# Patient Record
Sex: FEMALE | Race: WHITE | NOT HISPANIC OR LATINO | Employment: FULL TIME | URBAN - METROPOLITAN AREA
[De-identification: names, ages, dates, MRNs, and addresses within clinical notes are randomized per-mention and may not be internally consistent; named-entity substitution may affect disease eponyms.]

---

## 2017-01-12 ENCOUNTER — LAB (OUTPATIENT)
Dept: LAB | Facility: HOSPITAL | Age: 57
End: 2017-01-12

## 2017-01-12 ENCOUNTER — OFFICE VISIT (OUTPATIENT)
Dept: BARIATRICS/WEIGHT MGMT | Facility: CLINIC | Age: 57
End: 2017-01-12

## 2017-01-12 VITALS
BODY MASS INDEX: 34.21 KG/M2 | HEART RATE: 68 BPM | WEIGHT: 218 LBS | SYSTOLIC BLOOD PRESSURE: 119 MMHG | TEMPERATURE: 98.3 F | HEIGHT: 67 IN | RESPIRATION RATE: 16 BRPM | DIASTOLIC BLOOD PRESSURE: 67 MMHG

## 2017-01-12 DIAGNOSIS — G47.33 OSA (OBSTRUCTIVE SLEEP APNEA): ICD-10-CM

## 2017-01-12 DIAGNOSIS — Z71.3 DIETARY COUNSELING: ICD-10-CM

## 2017-01-12 DIAGNOSIS — E66.9 OBESITY, CLASS II, BMI 35-39.9: Primary | ICD-10-CM

## 2017-01-12 DIAGNOSIS — Z87.19 HISTORY OF FATTY INFILTRATION OF LIVER: ICD-10-CM

## 2017-01-12 DIAGNOSIS — E66.9 OBESITY, CLASS II, BMI 35-39.9: ICD-10-CM

## 2017-01-12 LAB
ALBUMIN SERPL-MCNC: 4 G/DL (ref 3.5–5.2)
ALBUMIN/GLOB SERPL: 1.1 G/DL
ALP SERPL-CCNC: 82 U/L (ref 39–117)
ALT SERPL W P-5'-P-CCNC: 18 U/L (ref 1–33)
ANION GAP SERPL CALCULATED.3IONS-SCNC: 13.1 MMOL/L
AST SERPL-CCNC: 21 U/L (ref 1–32)
BASOPHILS # BLD AUTO: 0.02 10*3/MM3 (ref 0–0.2)
BASOPHILS NFR BLD AUTO: 0.3 % (ref 0–1.5)
BILIRUB SERPL-MCNC: 0.6 MG/DL (ref 0.1–1.2)
BUN BLD-MCNC: 18 MG/DL (ref 6–20)
BUN/CREAT SERPL: 28.6 (ref 7–25)
CALCIUM SPEC-SCNC: 9.4 MG/DL (ref 8.6–10.5)
CHLORIDE SERPL-SCNC: 101 MMOL/L (ref 98–107)
CHOLEST SERPL-MCNC: 225 MG/DL (ref 0–200)
CO2 SERPL-SCNC: 23.9 MMOL/L (ref 22–29)
CREAT BLD-MCNC: 0.63 MG/DL (ref 0.57–1)
DEPRECATED RDW RBC AUTO: 44.6 FL (ref 37–54)
EOSINOPHIL # BLD AUTO: 0.18 10*3/MM3 (ref 0–0.7)
EOSINOPHIL NFR BLD AUTO: 2.9 % (ref 0.3–6.2)
ERYTHROCYTE [DISTWIDTH] IN BLOOD BY AUTOMATED COUNT: 14.2 % (ref 11.7–13)
FERRITIN SERPL-MCNC: 126.9 NG/ML (ref 13–150)
FOLATE SERPL-MCNC: 10.25 NG/ML (ref 4.78–24.2)
GFR SERPL CREATININE-BSD FRML MDRD: 98 ML/MIN/1.73
GLOBULIN UR ELPH-MCNC: 3.5 GM/DL
GLUCOSE BLD-MCNC: 92 MG/DL (ref 65–99)
HCT VFR BLD AUTO: 40.5 % (ref 35.6–45.5)
HDLC SERPL-MCNC: 40 MG/DL (ref 40–60)
HGB BLD-MCNC: 13.3 G/DL (ref 11.9–15.5)
IMM GRANULOCYTES # BLD: 0 10*3/MM3 (ref 0–0.03)
IMM GRANULOCYTES NFR BLD: 0 % (ref 0–0.5)
IRON 24H UR-MRATE: 51 MCG/DL (ref 37–145)
LDLC SERPL CALC-MCNC: 158 MG/DL (ref 0–100)
LDLC/HDLC SERPL: 3.94 {RATIO}
LYMPHOCYTES # BLD AUTO: 2.54 10*3/MM3 (ref 0.9–4.8)
LYMPHOCYTES NFR BLD AUTO: 40.4 % (ref 19.6–45.3)
MCH RBC QN AUTO: 28.4 PG (ref 26.9–32)
MCHC RBC AUTO-ENTMCNC: 32.8 G/DL (ref 32.4–36.3)
MCV RBC AUTO: 86.4 FL (ref 80.5–98.2)
MONOCYTES # BLD AUTO: 0.51 10*3/MM3 (ref 0.2–1.2)
MONOCYTES NFR BLD AUTO: 8.1 % (ref 5–12)
NEUTROPHILS # BLD AUTO: 3.04 10*3/MM3 (ref 1.9–8.1)
NEUTROPHILS NFR BLD AUTO: 48.3 % (ref 42.7–76)
PLATELET # BLD AUTO: 229 10*3/MM3 (ref 140–500)
PMV BLD AUTO: 10 FL (ref 6–12)
POTASSIUM BLD-SCNC: 4.1 MMOL/L (ref 3.5–5.2)
PROT SERPL-MCNC: 7.5 G/DL (ref 6–8.5)
RBC # BLD AUTO: 4.69 10*6/MM3 (ref 3.9–5.2)
SODIUM BLD-SCNC: 138 MMOL/L (ref 136–145)
TRIGL SERPL-MCNC: 137 MG/DL (ref 0–150)
VLDLC SERPL-MCNC: 27.4 MG/DL (ref 5–40)
WBC NRBC COR # BLD: 6.29 10*3/MM3 (ref 4.5–10.7)

## 2017-01-12 PROCEDURE — 85025 COMPLETE CBC W/AUTO DIFF WBC: CPT

## 2017-01-12 PROCEDURE — 99213 OFFICE O/P EST LOW 20 MIN: CPT | Performed by: SURGERY

## 2017-01-12 PROCEDURE — 83540 ASSAY OF IRON: CPT

## 2017-01-12 PROCEDURE — 80061 LIPID PANEL: CPT | Performed by: SURGERY

## 2017-01-12 PROCEDURE — 80053 COMPREHEN METABOLIC PANEL: CPT

## 2017-01-12 PROCEDURE — 82728 ASSAY OF FERRITIN: CPT

## 2017-01-12 PROCEDURE — 83921 ORGANIC ACID SINGLE QUANT: CPT

## 2017-01-12 PROCEDURE — 84425 ASSAY OF VITAMIN B-1: CPT

## 2017-01-12 PROCEDURE — 36415 COLL VENOUS BLD VENIPUNCTURE: CPT

## 2017-01-12 PROCEDURE — 82746 ASSAY OF FOLIC ACID SERUM: CPT

## 2017-01-12 NOTE — MR AVS SNAPSHOT
Elizabeth Montiel   1/12/2017 10:00 AM   Office Visit    Dept Phone:  [phone]   Encounter #:  [id number]    Provider:  Enrique Hannon Jr., MD   Department:  [facility] BARIATRIC SURGERY                Your Full Care Plan              Today's Medication Changes          These changes are accurate as of: 1/12/17 10:24 AM.  If you have any questions, ask your nurse or doctor.               Stop taking medication(s)listed here:     benzonatate 200 MG capsule   Commonly known as:  TESSALON           omeprazole 20 MG capsule   Commonly known as:  priLOSEC           STAHIST AD 25-60 MG tablet   Generic drug:  Chlorcyclizine-Pseudoephed                      Your Updated Medication List          This list is accurate as of: 1/12/17 10:24 AM.  Always use your most recent med list.                levothyroxine 112 MCG tablet   Commonly known as:  SYNTHROID, LEVOTHROID   Take 1 tablet by mouth Every Morning for 180 days.       MINIVELLE 0.0375 MG/24HR   Generic drug:  estradiol       NASCOBAL 500 MCG/0.1ML solution   Generic drug:  Cyanocobalamin       pseudoephedrine-guaifenesin  MG per 12 hr tablet   Commonly known as:  MUCINEX D   Take 1 tablet by mouth Every 12 (Twelve) Hours.       ursodiol 300 MG capsule   Commonly known as:  ACTIGALL   Take 1 capsule by mouth 2 (two) times a day.       venlafaxine XR 75 MG 24 hr capsule   Commonly known as:  EFFEXOR-XR   Take 1 capsule by mouth Every Morning for 180 days.               You Were Diagnosed With        Codes Comments    Obesity, Class II, BMI 35-39.9    -  Primary ICD-10-CM: E66.01  ICD-9-CM: 278.01     Dietary counseling     ICD-10-CM: Z71.3  ICD-9-CM: V65.3     History of fatty infiltration of liver     ICD-10-CM: Z87.19  ICD-9-CM: V12.79     LUANNE (obstructive sleep apnea)     ICD-10-CM: G47.33  ICD-9-CM: 327.23       Instructions     None    Patient Instructions History      Upcoming Appointments     Visit Type Date  "Time Department    FOLLOW UP 1/12/2017 10:00 AM [facility]    FOLLOW UP 4/13/2017  9:00 AM [facility]ATR[facility]      MyChart Signup     Our records indicate that you have an active [facility] [employer] account.    You can view your After Visit Summary by going to [employer] and logging in with your [employer] username and password.  If you don't have a [employer] username and password but a parent or guardian has access to your record, the parent or guardian should login with their own [employer] username and password and access your record to view the After Visit Summary.    If you have questions, you can email [email or web address] or call [phone] to talk to our [employer] staff.  Remember, [employer] is NOT to be used for urgent needs.  For medical emergencies, dial 911.               Other Info from Your Visit           Your Appointments     Apr 13, 2017  9:00 AM EDT   Follow Up with MELVIN Garcia   [facility] BARIATRIC SURGERY (--)    [address]  [facility] [id number] [phone]           Arrive 15 minutes prior to appointment.              Allergies     Penicillins  Anaphylaxis    Tricor [Fenofibrate]  Rash      Reason for Visit     Follow-up s/p gastric sleeve c/o constipation and alopecia      Vital Signs     Blood Pressure Pulse Temperature Respirations Height Weight    119/67 68 98.3 °F (36.8 °C) (Oral) 16 66.5\" (168.9 cm) 218 lb (98.9 kg)    Body Mass Index Smoking Status                34.66 kg/m2 Never Smoker          Problems and Diagnoses Noted     Dietary counseling    History of fatty infiltration of liver    Obesity    LUANNE (obstructive sleep apnea)      No Longer an Issue     Chronic fatigue    Nauseous    Prediabetes        "

## 2017-01-12 NOTE — PROGRESS NOTES
[facility] BARIATRIC SURGERY  [address]  [facility] [address]  [address]  [facility] [address]  Dept: [phone]  1/12/2017      Elizabeth Montiel.  [id number]  [id number]  1960  female      Chief Complaint   Patient presents with   • Follow-up     s/p gastric sleeve c/o constipation and alopecia       BH Post-Op Bariatric Surgery:   Elizabeth Montiel is status post laparopscopic Sleeve procedure, performed on 7/11/16    HPI:   Today's weight is 218 lb (98.9 kg) pounds, today's BMI is Body mass index is 34.66 kg/(m^2)., she has a  loss of 32 pounds since the last visit and her weight loss since surgery is 90 pounds. The patient reports a decreased portion size and loss of appetite.      Elizabeth Montiel denies nausea, vomiting, dysphagia, abdominal pain or heartburn.     Diet and Exercise: Diet history reviewed and discussed with the patient. Weight loss/gains to date discussed with the patient. The patient states they are eating 90 grams of protein per day. She reports eating 3 meals per day, a typical portion size of 1/2 cup, eating 2 snacks per day, drinking 5 or more 8-oz. glasses of water per day, no carbonated beverage consumption and exercising regularly.     Patient 6 months status post laparoscopic sleeve gastrectomy who is doing very well.  Only complaint is constipation and some hair thinning.    Supplements: Bariatric advantage vitamins per gastric sleeve.     Review of Systems   All other systems reviewed and are negative.      Patient Active Problem List   Diagnosis   • Acquired hypothyroidism   • Depression   • Multiple joint pain   • LUANNE (obstructive sleep apnea)   • History of fatty infiltration of liver   • Borderline hyperlipidemia   • Dietary counseling   • Obesity, Class II, BMI 35-39.9       The following portions of the patient's history were reviewed and updated as appropriate: allergies, current medications, past  family history, past medical history, past social history, past surgical history and problem list.    Vitals:    01/12/17 0951   BP: 119/67   Pulse: 68   Resp: 16   Temp: 98.3 °F (36.8 °C)       Physical Exam   Constitutional: She is oriented to person, place, and time. She appears well-nourished.   HENT:   Head: Normocephalic and atraumatic.   Mouth/Throat: Oropharynx is clear and moist.   Eyes: Conjunctivae and EOM are normal. Pupils are equal, round, and reactive to light. No scleral icterus.   Neck: Normal range of motion. Neck supple. No thyromegaly present.   Cardiovascular: Normal rate and regular rhythm.    Pulmonary/Chest: Effort normal and breath sounds normal.   Abdominal: Soft. Bowel sounds are normal. She exhibits no distension. There is no tenderness.   Musculoskeletal: Normal range of motion.   Lymphadenopathy:     She has no cervical adenopathy.   Neurological: She is alert and oriented to person, place, and time. No cranial nerve deficit. Coordination normal.   Skin: Skin is warm and dry. No erythema.   Psychiatric: She has a normal mood and affect. Her behavior is normal.   Vitals reviewed.        Assessment:   Post-op, the patient doing very well 6 months status post laparoscopic sleeve gastrectomy.  She has lost over 90 pounds since surgery and is feeling great.  I instructed her to take MiraLAX when necessary and Metamucil every day for the constipation.  Also she can add biotin for hair thinning.  Continue with her diet and exercise routine.  We will check 6 months labs.     Plan:       Activity restrictions: none.   Recommended patient be sure to get at least 70 grams of protein per day by eating small, frequent meals all with high lean protein choices. Be sure to limit/cut back on daily carbohydrate intake. Discussed with the patient the recommended amount of water per day to intake- half of body weight in ounces. Reviewed vitamin requirements. Be sure to do routine exercise, 150 minutes per  week minimum, including both cardio and strength training.     Instructions / Recommendations: dietary counseling recommended, recommended a daily protein intake of  grams, vitamin supplement(s) recommended, recommended exercising at least 150 minutes per week, behavior modifications recommended and instructed to call the office for concerns, questions, or problems.     The patient was instructed to follow up in 3 months .     The patient was counseled regarding. Total time spent face to face was 20 minutes and more than half the time was spent counseling.

## 2017-01-14 LAB — METHYLMALONATE SERPL-SCNC: 148 NMOL/L (ref 0–378)

## 2017-01-15 LAB — VIT B1 BLD-SCNC: 119.2 NMOL/L (ref 66.5–200)

## 2017-03-04 ENCOUNTER — OFFICE VISIT (OUTPATIENT)
Dept: RETAIL CLINIC | Facility: CLINIC | Age: 57
End: 2017-03-04

## 2017-03-04 VITALS — TEMPERATURE: 98.7 F | HEART RATE: 82 BPM | OXYGEN SATURATION: 98 %

## 2017-03-04 DIAGNOSIS — J01.90 ACUTE NON-RECURRENT SINUSITIS, UNSPECIFIED LOCATION: Primary | ICD-10-CM

## 2017-03-04 PROCEDURE — 99213 OFFICE O/P EST LOW 20 MIN: CPT | Performed by: NURSE PRACTITIONER

## 2017-03-04 PROCEDURE — 96372 THER/PROPH/DIAG INJ SC/IM: CPT | Performed by: NURSE PRACTITIONER

## 2017-03-04 RX ORDER — SULFAMETHOXAZOLE AND TRIMETHOPRIM 800; 160 MG/1; MG/1
1 TABLET ORAL 2 TIMES DAILY
Qty: 20 TABLET | Refills: 0 | Status: SHIPPED | OUTPATIENT
Start: 2017-03-04 | End: 2017-03-14

## 2017-03-04 RX ORDER — METHYLPREDNISOLONE ACETATE 40 MG/ML
40 INJECTION, SUSPENSION INTRA-ARTICULAR; INTRALESIONAL; INTRAMUSCULAR; SOFT TISSUE ONCE
Status: COMPLETED | OUTPATIENT
Start: 2017-03-04 | End: 2017-03-04

## 2017-03-04 RX ADMIN — METHYLPREDNISOLONE ACETATE 40 MG: 40 INJECTION, SUSPENSION INTRA-ARTICULAR; INTRALESIONAL; INTRAMUSCULAR; SOFT TISSUE at 09:27

## 2017-03-04 NOTE — PROGRESS NOTES
Subjective   Patient ID: Elizabeth Montiel is a 56 y.o. female presents with   Chief Complaint   Patient presents with   • URI     1 week       HPI Comments: Cc: green nasal discharge x 3d. Mild assoc cough, hoarseness x 1 week. She had flu shot this year. Has taken some mucinex with no relief. Pt had anaphylactic reaction to PCN in the past.       Allergies   Allergen Reactions   • Penicillins Anaphylaxis   • Codeine    • Tricor [Fenofibrate] Rash       The following portions of the patient's history were reviewed and updated as appropriate: allergies, current medications, past family history, past medical history, past social history, past surgical history and problem list.      Review of Systems   Constitutional: Positive for fatigue. Negative for activity change, fever and unexpected weight change.   HENT: Positive for congestion, postnasal drip, rhinorrhea, sinus pressure, sore throat and voice change. Negative for ear pain.         Hoarseness   Eyes: Negative for pain and discharge.   Respiratory: Positive for cough. Negative for chest tightness, shortness of breath and wheezing.    Cardiovascular: Negative for chest pain and palpitations.   Gastrointestinal: Negative for abdominal pain, diarrhea and vomiting.   Endocrine: Negative.    Genitourinary: Negative.    Musculoskeletal: Negative for joint swelling.   Skin: Negative for color change, rash and wound.   Allergic/Immunologic: Negative.    Neurological: Negative for seizures and syncope.   Psychiatric/Behavioral: Negative.        Objective     Vitals:    03/04/17 0830   Pulse: 82   Temp: 98.7 °F (37.1 °C)   SpO2: 98%         Physical Exam   Constitutional: She is oriented to person, place, and time. She appears well-developed and well-nourished.  Non-toxic appearance. No distress.   HENT:   Head: Normocephalic and atraumatic. Hair is normal.   Right Ear: Hearing, tympanic membrane, external ear and ear canal normal. No drainage, swelling or tenderness.    Left Ear: Hearing, tympanic membrane, external ear and ear canal normal. No drainage, swelling or tenderness.   Nose: Mucosal edema and rhinorrhea present. No epistaxis.   Mouth/Throat: Uvula is midline and mucous membranes are normal. No oral lesions. No uvula swelling. Posterior oropharyngeal erythema present. No oropharyngeal exudate. Tonsils are 1+ on the right. Tonsils are 1+ on the left. No tonsillar exudate.   +severe hoarseness   Eyes: Conjunctivae, EOM and lids are normal. Pupils are equal, round, and reactive to light. Right eye exhibits no discharge. Left eye exhibits no discharge. No scleral icterus.   Neck: Normal range of motion. Neck supple.   Cardiovascular: Normal rate, regular rhythm and normal heart sounds.  Exam reveals no gallop.    No murmur heard.  Pulmonary/Chest: Effort normal and breath sounds normal. No stridor. No respiratory distress. She has no wheezes. She has no rales. She exhibits no tenderness.   Abdominal: Soft. There is no tenderness.   Lymphadenopathy:        Head (right side): No tonsillar adenopathy present.        Head (left side): No tonsillar adenopathy present.     She has no cervical adenopathy.   Neurological: She is alert and oriented to person, place, and time. She exhibits normal muscle tone.   Skin: Skin is warm and dry. No rash noted. She is not diaphoretic.   Psychiatric: She has a normal mood and affect. Her behavior is normal. Judgment and thought content normal.   Nursing note and vitals reviewed.        Elizabeth was seen today for uri.    Diagnoses and all orders for this visit:    Acute non-recurrent sinusitis, unspecified location  -     sulfamethoxazole-trimethoprim (BACTRIM DS,SEPTRA DS) 800-160 MG per tablet; Take 1 tablet by mouth 2 (Two) Times a Day for 10 days.  -     methylPREDNISolone acetate (DEPO-medrol) injection 40 mg; Inject 1 mL into the shoulder, thigh, or buttocks 1 (One) Time.    Increase fluids, rest, activity as tolerated, Tylenol OTC as  needed for pain  NS nasal rinse prn congestion. Voice rest.  Follow-up with Primary Care Physician in 24-48 hours if these symptoms worsen or fail to improve as anticipated.

## 2017-04-13 ENCOUNTER — LAB (OUTPATIENT)
Dept: LAB | Facility: HOSPITAL | Age: 57
End: 2017-04-13

## 2017-04-13 ENCOUNTER — OFFICE VISIT (OUTPATIENT)
Dept: BARIATRICS/WEIGHT MGMT | Facility: CLINIC | Age: 57
End: 2017-04-13

## 2017-04-13 VITALS
TEMPERATURE: 98.1 F | RESPIRATION RATE: 16 BRPM | BODY MASS INDEX: 30.92 KG/M2 | WEIGHT: 197 LBS | HEART RATE: 58 BPM | DIASTOLIC BLOOD PRESSURE: 64 MMHG | HEIGHT: 67 IN | SYSTOLIC BLOOD PRESSURE: 109 MMHG

## 2017-04-13 DIAGNOSIS — E66.9 OBESITY, CLASS I, BMI 30-34.9: Primary | ICD-10-CM

## 2017-04-13 DIAGNOSIS — G47.33 OSA (OBSTRUCTIVE SLEEP APNEA): ICD-10-CM

## 2017-04-13 DIAGNOSIS — M25.50 MULTIPLE JOINT PAIN: ICD-10-CM

## 2017-04-13 DIAGNOSIS — Z71.3 DIETARY COUNSELING: ICD-10-CM

## 2017-04-13 DIAGNOSIS — E03.9 ACQUIRED HYPOTHYROIDISM: ICD-10-CM

## 2017-04-13 DIAGNOSIS — E78.5 BORDERLINE HYPERLIPIDEMIA: ICD-10-CM

## 2017-04-13 DIAGNOSIS — Z87.19 HISTORY OF FATTY INFILTRATION OF LIVER: ICD-10-CM

## 2017-04-13 DIAGNOSIS — E66.9 OBESITY, CLASS I, BMI 30-34.9: ICD-10-CM

## 2017-04-13 PROBLEM — E66.811 OBESITY, CLASS I, BMI 30-34.9: Status: ACTIVE | Noted: 2017-04-13

## 2017-04-13 LAB
ALBUMIN SERPL-MCNC: 4.1 G/DL (ref 3.5–5.2)
ALBUMIN/GLOB SERPL: 1.1 G/DL
ALP SERPL-CCNC: 81 U/L (ref 39–117)
ALT SERPL W P-5'-P-CCNC: 19 U/L (ref 1–33)
ANION GAP SERPL CALCULATED.3IONS-SCNC: 12.3 MMOL/L
AST SERPL-CCNC: 24 U/L (ref 1–32)
BASOPHILS # BLD AUTO: 0.05 10*3/MM3 (ref 0–0.2)
BASOPHILS NFR BLD AUTO: 0.6 % (ref 0–1.5)
BILIRUB SERPL-MCNC: 0.5 MG/DL (ref 0.1–1.2)
BUN BLD-MCNC: 17 MG/DL (ref 6–20)
BUN/CREAT SERPL: 21 (ref 7–25)
CALCIUM SPEC-SCNC: 9.8 MG/DL (ref 8.6–10.5)
CHLORIDE SERPL-SCNC: 101 MMOL/L (ref 98–107)
CO2 SERPL-SCNC: 26.7 MMOL/L (ref 22–29)
CREAT BLD-MCNC: 0.81 MG/DL (ref 0.57–1)
DEPRECATED RDW RBC AUTO: 44.7 FL (ref 37–54)
EOSINOPHIL # BLD AUTO: 0.25 10*3/MM3 (ref 0–0.7)
EOSINOPHIL NFR BLD AUTO: 3.1 % (ref 0.3–6.2)
ERYTHROCYTE [DISTWIDTH] IN BLOOD BY AUTOMATED COUNT: 14.3 % (ref 11.7–13)
FERRITIN SERPL-MCNC: 159.7 NG/ML (ref 13–150)
GFR SERPL CREATININE-BSD FRML MDRD: 74 ML/MIN/1.73
GLOBULIN UR ELPH-MCNC: 3.8 GM/DL
GLUCOSE BLD-MCNC: 90 MG/DL (ref 65–99)
HCT VFR BLD AUTO: 40.3 % (ref 35.6–45.5)
HGB BLD-MCNC: 13.3 G/DL (ref 11.9–15.5)
IMM GRANULOCYTES # BLD: 0 10*3/MM3 (ref 0–0.03)
IMM GRANULOCYTES NFR BLD: 0 % (ref 0–0.5)
LYMPHOCYTES # BLD AUTO: 3.43 10*3/MM3 (ref 0.9–4.8)
LYMPHOCYTES NFR BLD AUTO: 42.1 % (ref 19.6–45.3)
MCH RBC QN AUTO: 28.5 PG (ref 26.9–32)
MCHC RBC AUTO-ENTMCNC: 33 G/DL (ref 32.4–36.3)
MCV RBC AUTO: 86.3 FL (ref 80.5–98.2)
MONOCYTES # BLD AUTO: 0.66 10*3/MM3 (ref 0.2–1.2)
MONOCYTES NFR BLD AUTO: 8.1 % (ref 5–12)
NEUTROPHILS # BLD AUTO: 3.76 10*3/MM3 (ref 1.9–8.1)
NEUTROPHILS NFR BLD AUTO: 46.1 % (ref 42.7–76)
PLATELET # BLD AUTO: 237 10*3/MM3 (ref 140–500)
PMV BLD AUTO: 10 FL (ref 6–12)
POTASSIUM BLD-SCNC: 4 MMOL/L (ref 3.5–5.2)
PROT SERPL-MCNC: 7.9 G/DL (ref 6–8.5)
RBC # BLD AUTO: 4.67 10*6/MM3 (ref 3.9–5.2)
SODIUM BLD-SCNC: 140 MMOL/L (ref 136–145)
TSH SERPL DL<=0.05 MIU/L-ACNC: 0.57 MIU/ML (ref 0.27–4.2)
WBC NRBC COR # BLD: 8.15 10*3/MM3 (ref 4.5–10.7)

## 2017-04-13 PROCEDURE — 83921 ORGANIC ACID SINGLE QUANT: CPT

## 2017-04-13 PROCEDURE — 82728 ASSAY OF FERRITIN: CPT

## 2017-04-13 PROCEDURE — 84425 ASSAY OF VITAMIN B-1: CPT

## 2017-04-13 PROCEDURE — 36415 COLL VENOUS BLD VENIPUNCTURE: CPT

## 2017-04-13 PROCEDURE — 85025 COMPLETE CBC W/AUTO DIFF WBC: CPT

## 2017-04-13 PROCEDURE — 84443 ASSAY THYROID STIM HORMONE: CPT | Performed by: NURSE PRACTITIONER

## 2017-04-13 PROCEDURE — 99212 OFFICE O/P EST SF 10 MIN: CPT | Performed by: NURSE PRACTITIONER

## 2017-04-13 PROCEDURE — 80053 COMPREHEN METABOLIC PANEL: CPT

## 2017-04-13 NOTE — PROGRESS NOTES
[facility] BARIATRIC SURGERY  [address]  [facility] [address] [phone]  [address]  [facility] [address] [phone]  Dept: [phone]  4/13/2017      Elizabeth Montiel.  [id number]  [id number]  1960  female      Chief Complaint   Patient presents with   • Follow-up     s/p gastric sleeve c/o constipation and alopecia       BH Post-Op Bariatric Surgery:   Elizabeth Montiel is status post laparopscopic Sleeve procedure, performed on 7/11/16    HPI:   Today's weight is 197 lb (89.4 kg) pounds, today's BMI is Body mass index is 31.32 kg/(m^2)., she has a  loss of 21 pounds since the last visit and her weight loss since surgery is 111 pounds. The patient reports a decreased portion size and loss of appetite.      Elizabeth Montiel denies nausea, vomiting, dysphagia, abdominal pain or heartburn.     Diet and Exercise: Diet history reviewed and discussed with the patient. Weight loss/gains to date discussed with the patient. The patient states they are eating 90 grams of protein per day. She reports eating 3 meals per day, a typical portion size of 2/3 cup, eating 2 snacks per day, drinking 5 or more 8-oz. glasses of water per day, no carbonated beverage consumption and exercising regularly- doing high intensity cardio a few days a week.     Supplements: Bariatric MVI.     Review of Systems   Constitutional:        Alopecia   Gastrointestinal: Positive for constipation.   All other systems reviewed and are negative.      Patient Active Problem List   Diagnosis   • Acquired hypothyroidism   • Depression   • Multiple joint pain   • LUANNE (obstructive sleep apnea)   • History of fatty infiltration of liver   • Borderline hyperlipidemia   • Dietary counseling   • Obesity, Class II, BMI 35-39.9   • Acute non-recurrent sinusitis       The following portions of the patient's history were reviewed and updated as appropriate: allergies, current  medications, past family history, past medical history, past social history, past surgical history and problem list.    Vitals:    04/13/17 0922   BP: 109/64   Pulse: 58   Resp: 16   Temp: 98.1 °F (36.7 °C)       Physical Exam   Constitutional: She is oriented to person, place, and time. She appears well-developed and well-nourished.   HENT:   Head: Normocephalic and atraumatic.   Eyes: EOM are normal.   Cardiovascular: Normal rate, regular rhythm and normal heart sounds.    Pulmonary/Chest: Effort normal and breath sounds normal.   Abdominal: Soft. Bowel sounds are normal. She exhibits no distension. There is no tenderness.   Musculoskeletal: Normal range of motion.   Neurological: She is alert and oriented to person, place, and time.   Skin: Skin is warm and dry.   Psychiatric: She has a normal mood and affect. Her behavior is normal. Judgment and thought content normal.   Vitals reviewed.        Assessment:   Post-op, the patient is doing well.     Plan:     Encouraged patient to be sure to get plenty of lean protein per day through small frequent meals all with a protein source. Recommended be sure to get plenty of protein and add some biotin to help with alopecia. Discussed her water intake and fiber supplementation to help with constipation. Discussed with her weight loss goals. Per her request added TSH to lab work but knows her PCP will need to address if abnormal.   Activity restrictions: none.   Recommended patient be sure to get at least 70 grams of protein per day by eating small, frequent meals all with high lean protein choices. Be sure to limit/cut back on daily carbohydrate intake. Discussed with the patient the recommended amount of water per day to intake- half of body weight in ounces. Reviewed vitamin requirements. Be sure to do routine exercise, 150 minutes per week minimum, including both cardio and strength training.     Instructions / Recommendations: dietary counseling  recommended, recommended a daily protein intake of  grams, vitamin supplement(s) recommended, recommended exercising at least 150 minutes per week, behavior modifications recommended and instructed to call the office for concerns, questions, or problems.     The patient was instructed to follow up in 3 months.     The patient was counseled regarding. Total time spent face to face was 12 minutes and more than half the time was spent counseling.

## 2017-04-14 ENCOUNTER — TELEPHONE (OUTPATIENT)
Dept: FAMILY MEDICINE CLINIC | Facility: CLINIC | Age: 57
End: 2017-04-14

## 2017-04-14 NOTE — TELEPHONE ENCOUNTER
PT. Called stating the bariatric  Has recently done labs for her. She would like you to review them, and let her know the results?

## 2017-04-16 LAB — VIT B1 BLD-SCNC: 130.5 NMOL/L (ref 66.5–200)

## 2017-04-17 LAB — METHYLMALONATE SERPL-SCNC: 175 NMOL/L (ref 0–378)

## 2017-04-17 NOTE — TELEPHONE ENCOUNTER
I will need an appointment to discuss outside labs.  Make sure copy of the labs are on her chart prior to me reviewing and we will GO over the labs during the office visit.

## 2017-07-03 ENCOUNTER — OFFICE VISIT (OUTPATIENT)
Dept: FAMILY MEDICINE CLINIC | Facility: CLINIC | Age: 57
End: 2017-07-03

## 2017-07-03 VITALS
TEMPERATURE: 98.2 F | WEIGHT: 191 LBS | SYSTOLIC BLOOD PRESSURE: 117 MMHG | BODY MASS INDEX: 29.98 KG/M2 | HEART RATE: 54 BPM | HEIGHT: 67 IN | RESPIRATION RATE: 16 BRPM | DIASTOLIC BLOOD PRESSURE: 71 MMHG

## 2017-07-03 DIAGNOSIS — E03.9 ACQUIRED HYPOTHYROIDISM: ICD-10-CM

## 2017-07-03 DIAGNOSIS — F32.5 MAJOR DEPRESSIVE DISORDER WITH SINGLE EPISODE, IN FULL REMISSION (HCC): ICD-10-CM

## 2017-07-03 PROCEDURE — 99213 OFFICE O/P EST LOW 20 MIN: CPT | Performed by: FAMILY MEDICINE

## 2017-07-03 RX ORDER — VENLAFAXINE HYDROCHLORIDE 75 MG/1
75 CAPSULE, EXTENDED RELEASE ORAL EVERY MORNING
Qty: 30 CAPSULE | Refills: 5 | Status: SHIPPED | OUTPATIENT
Start: 2017-07-03 | End: 2018-02-01 | Stop reason: SDUPTHER

## 2017-07-03 RX ORDER — LEVOTHYROXINE SODIUM 112 UG/1
112 TABLET ORAL EVERY MORNING
Qty: 30 TABLET | Refills: 5 | Status: SHIPPED | OUTPATIENT
Start: 2017-07-03 | End: 2018-02-01 | Stop reason: SDUPTHER

## 2017-07-13 ENCOUNTER — LAB (OUTPATIENT)
Dept: LAB | Facility: HOSPITAL | Age: 57
End: 2017-07-13

## 2017-07-13 ENCOUNTER — OFFICE VISIT (OUTPATIENT)
Dept: BARIATRICS/WEIGHT MGMT | Facility: CLINIC | Age: 57
End: 2017-07-13

## 2017-07-13 VITALS
DIASTOLIC BLOOD PRESSURE: 72 MMHG | WEIGHT: 191 LBS | TEMPERATURE: 98.5 F | HEIGHT: 67 IN | RESPIRATION RATE: 16 BRPM | BODY MASS INDEX: 29.98 KG/M2 | SYSTOLIC BLOOD PRESSURE: 135 MMHG | HEART RATE: 63 BPM

## 2017-07-13 DIAGNOSIS — E78.5 BORDERLINE HYPERLIPIDEMIA: ICD-10-CM

## 2017-07-13 DIAGNOSIS — E03.9 ACQUIRED HYPOTHYROIDISM: ICD-10-CM

## 2017-07-13 DIAGNOSIS — L65.9 ALOPECIA: ICD-10-CM

## 2017-07-13 DIAGNOSIS — Z71.3 DIETARY COUNSELING: ICD-10-CM

## 2017-07-13 DIAGNOSIS — M25.50 MULTIPLE JOINT PAIN: ICD-10-CM

## 2017-07-13 DIAGNOSIS — Z87.19 HISTORY OF FATTY INFILTRATION OF LIVER: ICD-10-CM

## 2017-07-13 DIAGNOSIS — E66.9 OBESITY, CLASS I, BMI 30-34.9: ICD-10-CM

## 2017-07-13 DIAGNOSIS — E66.9 OBESITY, CLASS I, BMI 30-34.9: Primary | ICD-10-CM

## 2017-07-13 DIAGNOSIS — L98.7 EXCESS SKIN: ICD-10-CM

## 2017-07-13 DIAGNOSIS — G47.33 OSA (OBSTRUCTIVE SLEEP APNEA): ICD-10-CM

## 2017-07-13 LAB
25(OH)D3 SERPL-MCNC: 22.1 NG/ML (ref 30–100)
ALBUMIN SERPL-MCNC: 4.3 G/DL (ref 3.5–5.2)
ALBUMIN/GLOB SERPL: 1.1 G/DL
ALP SERPL-CCNC: 78 U/L (ref 39–117)
ALT SERPL W P-5'-P-CCNC: 18 U/L (ref 1–33)
ANION GAP SERPL CALCULATED.3IONS-SCNC: 13.3 MMOL/L
AST SERPL-CCNC: 23 U/L (ref 1–32)
BASOPHILS # BLD AUTO: 0.06 10*3/MM3 (ref 0–0.2)
BASOPHILS NFR BLD AUTO: 0.9 % (ref 0–1.5)
BILIRUB SERPL-MCNC: 0.6 MG/DL (ref 0.1–1.2)
BUN BLD-MCNC: 15 MG/DL (ref 6–20)
BUN/CREAT SERPL: 18.3 (ref 7–25)
CALCIUM SPEC-SCNC: 10.2 MG/DL (ref 8.6–10.5)
CHLORIDE SERPL-SCNC: 100 MMOL/L (ref 98–107)
CHOLEST SERPL-MCNC: 241 MG/DL (ref 0–200)
CO2 SERPL-SCNC: 26.7 MMOL/L (ref 22–29)
CREAT BLD-MCNC: 0.82 MG/DL (ref 0.57–1)
DEPRECATED RDW RBC AUTO: 43.1 FL (ref 37–54)
EOSINOPHIL # BLD AUTO: 0.18 10*3/MM3 (ref 0–0.7)
EOSINOPHIL NFR BLD AUTO: 2.8 % (ref 0.3–6.2)
ERYTHROCYTE [DISTWIDTH] IN BLOOD BY AUTOMATED COUNT: 13.2 % (ref 11.7–13)
FERRITIN SERPL-MCNC: 157.3 NG/ML (ref 13–150)
FOLATE SERPL-MCNC: 3.46 NG/ML (ref 4.78–24.2)
GFR SERPL CREATININE-BSD FRML MDRD: 73 ML/MIN/1.73
GLOBULIN UR ELPH-MCNC: 3.8 GM/DL
GLUCOSE BLD-MCNC: 88 MG/DL (ref 65–99)
HBA1C MFR BLD: 5.31 % (ref 4.8–5.6)
HCT VFR BLD AUTO: 41.7 % (ref 35.6–45.5)
HDLC SERPL-MCNC: 50 MG/DL (ref 40–60)
HGB BLD-MCNC: 13.6 G/DL (ref 11.9–15.5)
IMM GRANULOCYTES # BLD: 0 10*3/MM3 (ref 0–0.03)
IMM GRANULOCYTES NFR BLD: 0 % (ref 0–0.5)
IRON 24H UR-MRATE: 61 MCG/DL (ref 37–145)
LDLC SERPL CALC-MCNC: 168 MG/DL (ref 0–100)
LDLC/HDLC SERPL: 3.36 {RATIO}
LYMPHOCYTES # BLD AUTO: 2.78 10*3/MM3 (ref 0.9–4.8)
LYMPHOCYTES NFR BLD AUTO: 43.8 % (ref 19.6–45.3)
MAGNESIUM SERPL-MCNC: 2.2 MG/DL (ref 1.6–2.6)
MCH RBC QN AUTO: 28.9 PG (ref 26.9–32)
MCHC RBC AUTO-ENTMCNC: 32.6 G/DL (ref 32.4–36.3)
MCV RBC AUTO: 88.7 FL (ref 80.5–98.2)
MONOCYTES # BLD AUTO: 0.51 10*3/MM3 (ref 0.2–1.2)
MONOCYTES NFR BLD AUTO: 8 % (ref 5–12)
NEUTROPHILS # BLD AUTO: 2.81 10*3/MM3 (ref 1.9–8.1)
NEUTROPHILS NFR BLD AUTO: 44.5 % (ref 42.7–76)
PHOSPHATE SERPL-MCNC: 4.1 MG/DL (ref 2.5–4.5)
PLATELET # BLD AUTO: 239 10*3/MM3 (ref 140–500)
PMV BLD AUTO: 9.7 FL (ref 6–12)
POTASSIUM BLD-SCNC: 4 MMOL/L (ref 3.5–5.2)
PREALB SERPL-MCNC: 19 MG/DL (ref 20–40)
PROT SERPL-MCNC: 8.1 G/DL (ref 6–8.5)
PTH-INTACT SERPL-MCNC: 52.5 PG/ML (ref 15–65)
RBC # BLD AUTO: 4.7 10*6/MM3 (ref 3.9–5.2)
SODIUM BLD-SCNC: 140 MMOL/L (ref 136–145)
TRIGL SERPL-MCNC: 116 MG/DL (ref 0–150)
TSH SERPL DL<=0.05 MIU/L-ACNC: 0.2 MIU/ML (ref 0.27–4.2)
VLDLC SERPL-MCNC: 23.2 MG/DL (ref 5–40)
WBC NRBC COR # BLD: 6.34 10*3/MM3 (ref 4.5–10.7)

## 2017-07-13 PROCEDURE — 84597 ASSAY OF VITAMIN K: CPT

## 2017-07-13 PROCEDURE — 84425 ASSAY OF VITAMIN B-1: CPT

## 2017-07-13 PROCEDURE — 84590 ASSAY OF VITAMIN A: CPT

## 2017-07-13 PROCEDURE — 84443 ASSAY THYROID STIM HORMONE: CPT | Performed by: NURSE PRACTITIONER

## 2017-07-13 PROCEDURE — 80053 COMPREHEN METABOLIC PANEL: CPT

## 2017-07-13 PROCEDURE — 83921 ORGANIC ACID SINGLE QUANT: CPT

## 2017-07-13 PROCEDURE — 83735 ASSAY OF MAGNESIUM: CPT

## 2017-07-13 PROCEDURE — 99213 OFFICE O/P EST LOW 20 MIN: CPT | Performed by: NURSE PRACTITIONER

## 2017-07-13 PROCEDURE — 82728 ASSAY OF FERRITIN: CPT

## 2017-07-13 PROCEDURE — 83540 ASSAY OF IRON: CPT

## 2017-07-13 PROCEDURE — 36415 COLL VENOUS BLD VENIPUNCTURE: CPT

## 2017-07-13 PROCEDURE — 84630 ASSAY OF ZINC: CPT

## 2017-07-13 PROCEDURE — 82306 VITAMIN D 25 HYDROXY: CPT

## 2017-07-13 PROCEDURE — 84100 ASSAY OF PHOSPHORUS: CPT

## 2017-07-13 PROCEDURE — 80061 LIPID PANEL: CPT

## 2017-07-13 PROCEDURE — 83036 HEMOGLOBIN GLYCOSYLATED A1C: CPT

## 2017-07-13 PROCEDURE — 85025 COMPLETE CBC W/AUTO DIFF WBC: CPT

## 2017-07-13 PROCEDURE — 84446 ASSAY OF VITAMIN E: CPT

## 2017-07-13 PROCEDURE — 84134 ASSAY OF PREALBUMIN: CPT

## 2017-07-13 PROCEDURE — 83970 ASSAY OF PARATHORMONE: CPT

## 2017-07-13 PROCEDURE — 82746 ASSAY OF FOLIC ACID SERUM: CPT

## 2017-07-13 NOTE — PROGRESS NOTES
[facility] BARIATRIC SURGERY  [address]  [facility] [address] [phone]  [address]  [facility] [address] [phone]  Dept: [phone]  7/13/2017      Elizabeth Montiel.  [id number]  [id number]  1960  female      Chief Complaint   Patient presents with   • Follow-up     s/p gastric sleeve c/o constipation and alopecia       BH Post-Op Bariatric Surgery:   Elizabeth Montiel is status post laparopscopic Sleeve procedure, performed on 7/11/16    HPI:   Today's weight is 191 lb (86.6 kg) pounds, today's BMI is Body mass index is 30.37 kg/(m^2)., she has a  loss of 6 pounds since the last visit and her weight loss since surgery is 117 pounds. The patient reports a decreased portion size and loss of appetite.      Elizabeth Montiel denies nausea, vomiting, dysphagia, abdominal pain or heartburn. C/O sometimes having a rash under pannus and thighs. Patient thinks she may have a hernia- not giving her much pain or symptoms at this point.     Diet and Exercise: Diet history reviewed and discussed with the patient. Weight loss/gains to date discussed with the patient. The patient states they are eating 90 grams of protein per day. She reports eating 3 meals per day, a typical portion size of 2/3 cup, eating 1 snacks per day, drinking 5 or more 8-oz. glasses of water per day, no carbonated beverage consumption and exercising regularly- sees a  3 times a week and started running.     Supplements: vitamin patch.     Review of Systems   Constitutional:        Alopecia   Gastrointestinal: Positive for constipation.   All other systems reviewed and are negative.      Patient Active Problem List   Diagnosis   • Acquired hypothyroidism   • Depression   • Multiple joint pain   • LUANNE (obstructive sleep apnea)   • History of fatty infiltration of liver   • Borderline hyperlipidemia   • Dietary counseling   • Acute non-recurrent sinusitis    • Obesity, Class I, BMI 30-34.9   • Excess skin   • Alopecia       The following portions of the patient's history were reviewed and updated as appropriate: allergies, current medications, past family history, past medical history, past social history, past surgical history and problem list.    Vitals:    07/13/17 1334   BP: 135/72   Pulse: 63   Resp: 16   Temp: 98.5 °F (36.9 °C)       Physical Exam   Constitutional: She is oriented to person, place, and time. She appears well-developed and well-nourished.   HENT:   Head: Normocephalic and atraumatic.   Eyes: EOM are normal.   Cardiovascular: Normal rate, regular rhythm and normal heart sounds.    Pulmonary/Chest: Effort normal and breath sounds normal.   Abdominal: Soft. Bowel sounds are normal. She exhibits no distension. There is no tenderness.   Musculoskeletal: Normal range of motion.   Neurological: She is alert and oriented to person, place, and time.   Skin: Skin is warm and dry.   Psychiatric: She has a normal mood and affect. Her behavior is normal. Judgment and thought content normal.   Vitals reviewed.        Assessment:   Post-op, the patient is doing well.     Encounter Diagnoses   Name Primary?   • Obesity, Class I, BMI 30-34.9 Yes   • Dietary counseling    • Borderline hyperlipidemia    • LUANNE (obstructive sleep apnea)    • Acquired hypothyroidism    • Multiple joint pain    • Excess skin    • Alopecia    • History of fatty infiltration of liver        Plan:     Encouraged patient to be sure to get plenty of lean protein per day through small frequent meals all with a protein source. Encouraged patient to be sure she is getting plenty of water and continue with vitamins. Recommended she add some fiber either through diet or supplementation. Continue with exercise. Discussed goals and that she could see the dietician for follow up education. Recommended food journal. Discussed follow up with general surgeon/plastic surgeon regarding skin  removal/looking into hernia- also needs to see PCP regarding skin rash.  Activity restrictions: none.   Recommended patient be sure to get at least 70 grams of protein per day by eating small, frequent meals all with high lean protein choices. Be sure to limit/cut back on daily carbohydrate intake. Discussed with the patient the recommended amount of water per day to intake- half of body weight in ounces. Reviewed vitamin requirements. Be sure to do routine exercise, 150 minutes per week minimum, including both cardio and strength training.     Instructions / Recommendations: dietary counseling recommended, recommended a daily protein intake of  grams, vitamin supplement(s) recommended, recommended exercising at least 150 minutes per week, behavior modifications recommended and instructed to call the office for concerns, questions, or problems.     The patient was instructed to follow up in 6 months.     The patient was counseled regarding. Total time spent face to face was 15 minutes and more than half the time was spent counseling.

## 2017-07-16 LAB — VIT B1 BLD-SCNC: 130.6 NMOL/L (ref 66.5–200)

## 2017-07-17 LAB — METHYLMALONATE SERPL-SCNC: 181 NMOL/L (ref 0–378)

## 2017-07-18 LAB
A-TOCOPHEROL VIT E SERPL-MCNC: 12.2 MG/L (ref 5.3–16.8)
VIT A SERPL-MCNC: 38 UG/DL (ref 20–65)
ZINC SERPL-MCNC: 67 UG/DL (ref 56–134)

## 2017-07-20 LAB — PHYTONADIONE SERPL-MCNC: 0.32 NG/ML (ref 0.13–1.88)

## 2017-07-20 RX ORDER — ERGOCALCIFEROL 1.25 MG/1
50000 CAPSULE ORAL
Qty: 3 CAPSULE | Refills: 0 | Status: SHIPPED | OUTPATIENT
Start: 2017-07-20 | End: 2017-08-04

## 2017-07-20 RX ORDER — FOLIC ACID 1 MG/1
1 TABLET ORAL DAILY
Qty: 30 TABLET | Refills: 3 | Status: SHIPPED | OUTPATIENT
Start: 2017-07-20 | End: 2017-12-02

## 2017-11-21 NOTE — PROGRESS NOTES
"Chief Complaint   Patient presents with   • Hypothyroidism   • Depression       Subjective   This patient presents the office to refill medications.  Recent labs have been reviewed and are seen below.  Thyroid is stable.  Her depression is stable.  Please refer to pH Q9 scoring below.  I have reviewed and updated her medications, medical history and problem list during today's office visit.   PHQ-9 Depression Screening 7/3/2017   Little interest or pleasure in doing things 0   Feeling down, depressed, or hopeless 0   Trouble falling or staying asleep, or sleeping too much 3   Feeling tired or having little energy 1   Poor appetite or overeating 0   Feeling bad about yourself - or that you are a failure or have let yourself or your family down 3   Trouble concentrating on things, such as reading the newspaper or watching television 1   Moving or speaking so slowly that other people could have noticed. Or the opposite - being so fidgety or restless that you have been moving around a lot more than usual 0   Thoughts that you would be better off dead, or of hurting yourself in some way 0   PHQ-9 Total Score 8   If you checked off any problems, how difficult have these problems made it for you to do your work, take care of things at home, or get along with other people? Not difficult at all          Social History   Substance Use Topics   • Smoking status: Never Smoker   • Smokeless tobacco: Never Used   • Alcohol use 0.6 oz/week     1 Cans of beer per week      Comment: 1-2 WEEK       Review of Systems   Constitutional: Negative for fatigue.   Cardiovascular: Negative for chest pain.       Objective   /71  Pulse 54  Temp 98.2 °F (36.8 °C) (Oral)   Resp 16  Ht 66.5\" (168.9 cm)  Wt 191 lb (86.6 kg)  BMI 30.37 kg/m2  Body mass index is 30.37 kg/(m^2).  Physical Exam   Constitutional: She is cooperative. No distress.   Eyes: Conjunctivae and lids are normal.   Neck: Carotid bruit is not present. No tracheal " deviation present.   Cardiovascular: Normal rate, regular rhythm and normal heart sounds.    No murmur heard.  Pulmonary/Chest: Effort normal and breath sounds normal.   Neurological: She is alert. She is not disoriented.   Skin: Skin is warm and dry.   Psychiatric: She has a normal mood and affect. Her speech is normal and behavior is normal.   Vitals reviewed.      Data Reviewed:    CMP:  Lab Results   Component Value Date     (H) 03/21/2016    BUN 17 04/13/2017    CREATININE 0.81 04/13/2017    EGFRIFNONA 74 04/13/2017    EGFRIFAFRI 88 03/21/2016     04/13/2017    K 4.0 04/13/2017     04/13/2017    CALCIUM 9.8 04/13/2017    PROTENTOTREF 7.7 03/21/2016    ALBUMIN 4.10 04/13/2017    LABGLOBREF 3.5 03/21/2016    BILITOT 0.5 04/13/2017    ALKPHOS 81 04/13/2017    AST 24 04/13/2017    ALT 19 04/13/2017     CBC w/ diff:   Lab Results   Component Value Date    WBC 8.15 04/13/2017    RBC 4.67 04/13/2017    HGB 13.3 04/13/2017    HCT 40.3 04/13/2017    MCV 86.3 04/13/2017    MCH 28.5 04/13/2017    MCHC 33.0 04/13/2017    RDW 14.3 (H) 04/13/2017     04/13/2017    NEUTRORELPCT 46.1 04/13/2017    AUTOIGPER 0.0 04/13/2017    LYMPHORELPCT 42.1 04/13/2017    MONORELPCT 8.1 04/13/2017    EOSRELPCT 3.1 04/13/2017    BASORELPCT 0.6 04/13/2017     LIPID PANEL:  Lab Results   Component Value Date    CHLPL 191 03/21/2016    TRIG 137 01/12/2017    HDL 40 01/12/2017    VLDL 27.4 01/12/2017     (H) 03/21/2016    LDLHDL 3.94 01/12/2017     TSH:  Lab Results   Component Value Date    TSH 0.571 04/13/2017       Assessment/Plan     Problem List Items Addressed This Visit        Endocrine    Acquired hypothyroidism    Relevant Medications    levothyroxine (SYNTHROID, LEVOTHROID) 112 MCG tablet       Other    Depression    Relevant Medications    venlafaxine XR (EFFEXOR-XR) 75 MG 24 hr capsule          Outpatient Encounter Prescriptions as of 7/3/2017   Medication Sig Dispense Refill   • MINIVELLE 0.0375  MG/24HR      • NASCOBAL 500 MCG/0.1ML solution      • levothyroxine (SYNTHROID, LEVOTHROID) 112 MCG tablet Take 1 tablet by mouth Every Morning for 180 days. 30 tablet 5   • venlafaxine XR (EFFEXOR-XR) 75 MG 24 hr capsule Take 1 capsule by mouth Every Morning for 180 days. 30 capsule 5   • [DISCONTINUED] levothyroxine (SYNTHROID, LEVOTHROID) 112 MCG tablet Take 1 tablet by mouth Every Morning for 180 days. 30 tablet 5   • [DISCONTINUED] venlafaxine XR (EFFEXOR-XR) 75 MG 24 hr capsule Take 1 capsule by mouth Every Morning for 180 days. 30 capsule 5     No facility-administered encounter medications on file as of 7/3/2017.        No orders of the defined types were placed in this encounter.      Continue with current treatment plan.         F/U in 6 months   6

## 2017-12-02 ENCOUNTER — OFFICE VISIT (OUTPATIENT)
Dept: RETAIL CLINIC | Facility: CLINIC | Age: 57
End: 2017-12-02

## 2017-12-02 VITALS
TEMPERATURE: 98.4 F | SYSTOLIC BLOOD PRESSURE: 112 MMHG | RESPIRATION RATE: 18 BRPM | DIASTOLIC BLOOD PRESSURE: 71 MMHG | OXYGEN SATURATION: 98 % | HEART RATE: 77 BPM

## 2017-12-02 DIAGNOSIS — J40 BRONCHITIS: Primary | ICD-10-CM

## 2017-12-02 PROCEDURE — 99213 OFFICE O/P EST LOW 20 MIN: CPT | Performed by: NURSE PRACTITIONER

## 2017-12-02 RX ORDER — AZITHROMYCIN 250 MG/1
TABLET, FILM COATED ORAL
Qty: 6 TABLET | Refills: 0 | Status: SHIPPED | OUTPATIENT
Start: 2017-12-02 | End: 2018-02-01

## 2017-12-02 RX ORDER — PREDNISONE 20 MG/1
TABLET ORAL
Qty: 20 TABLET | Refills: 0 | Status: SHIPPED | OUTPATIENT
Start: 2017-12-02 | End: 2018-02-01

## 2017-12-02 RX ORDER — BROMPHENIRAMINE MALEATE, PSEUDOEPHEDRINE HYDROCHLORIDE, AND DEXTROMETHORPHAN HYDROBROMIDE 2; 30; 10 MG/5ML; MG/5ML; MG/5ML
SYRUP ORAL
Qty: 240 ML | Refills: 0 | Status: SHIPPED | OUTPATIENT
Start: 2017-12-02 | End: 2018-02-01

## 2017-12-02 RX ORDER — BENZONATATE 100 MG/1
CAPSULE ORAL
Qty: 30 CAPSULE | Refills: 0 | Status: SHIPPED | OUTPATIENT
Start: 2017-12-02 | End: 2018-02-01

## 2017-12-02 NOTE — PROGRESS NOTES
"Subjective:     Elizabeth Montiel is a 56 y.o.     Cough   This is a new (was seen in UC diagnosed with bronchitis and treated with doxyclycline  and flonase, gi intolerance to doxycycline) problem. The current episode started 1 to 4 weeks ago. The problem has been gradually worsening. The cough is non-productive. Associated symptoms include ear pain, a fever, headaches, nasal congestion, postnasal drip, a sore throat and shortness of breath (\"maybe\"). Pertinent negatives include no myalgias, rash or wheezing. Treatments tried: doxy-intolerance, flonase, antihistamine. The treatment provided no relief.         The following portions of the patient's history were reviewed and updated as appropriate: allergies, current medications, past family history, past medical history, past social history, past surgical history and problem list.      Review of Systems   Constitutional: Positive for fever. Appetite change: decreased.   HENT: Positive for congestion, ear pain, postnasal drip and sore throat.    Respiratory: Positive for cough and shortness of breath (\"maybe\"). Negative for wheezing.    Cardiovascular: Negative.    Gastrointestinal: Positive for diarrhea, nausea and vomiting.   Musculoskeletal: Negative for myalgias.   Skin: Negative for color change, pallor and rash.   Neurological: Positive for headaches. Negative for dizziness and light-headedness.         Objective:      Physical Exam   Constitutional: She is oriented to person, place, and time. She appears well-developed and well-nourished. She is cooperative.   HENT:   Head: Normocephalic and atraumatic.   Right Ear: Ear canal normal. Right ear middle ear effusion: mild serous    Left Ear: Ear canal normal. Left ear middle ear effusion: mild serous.   Mouth/Throat: Posterior oropharyngeal erythema present. No oropharyngeal exudate.   Mild nasal congestion, post nasal drip noted   Eyes: Conjunctivae, EOM and lids are normal. Pupils are equal, round, and reactive " to light.   Neck: Normal range of motion. Neck supple.   Cardiovascular: Normal rate, regular rhythm, S1 normal, S2 normal and normal heart sounds.    Pulmonary/Chest: Effort normal. She has rhonchi in the right upper field, the right middle field and the left upper field.   Abdominal: Soft. Normal appearance and bowel sounds are normal. There is no tenderness.   Musculoskeletal: Normal range of motion.   Lymphadenopathy:     She has no cervical adenopathy.   Neurological: She is alert and oriented to person, place, and time.   Skin: Skin is warm, dry and intact.   Psychiatric: She has a normal mood and affect. Her speech is normal and behavior is normal. Thought content normal.   Vitals reviewed.          Elizabeth was seen today for cough.    Diagnoses and all orders for this visit:    Bronchitis    Other orders  -     predniSONE (DELTASONE) 20 MG tablet; Prednisone 20mg tabs, 3 for 3 days, 2 for 3 days, 1 for 3 days, 1/2 for 3 days take with food or milk  -     brompheniramine-pseudoephedrine-DM (BROMFED DM) 30-2-10 MG/5ML syrup; 5 to 10 cc every 4 hours as needed for cough, congestion, allergies  -     benzonatate (TESSALON PERLES) 100 MG capsule; 1 to 2 capsules 3 times a day  -     azithromycin (ZITHROMAX) 250 MG tablet; Take 2 tablets the first day, then 1 tablet daily for 4 days.  If symptoms worsen or persist follow up with primary care provider.

## 2017-12-02 NOTE — PATIENT INSTRUCTIONS
Acute Bronchitis  Bronchitis is inflammation of the airways that extend from the windpipe into the lungs (bronchi). The inflammation often causes mucus to develop. This leads to a cough, which is the most common symptom of bronchitis.   In acute bronchitis, the condition usually develops suddenly and goes away over time, usually in a couple weeks. Smoking, allergies, and asthma can make bronchitis worse. Repeated episodes of bronchitis may cause further lung problems.   CAUSES  Acute bronchitis is most often caused by the same virus that causes a cold. The virus can spread from person to person (contagious) through coughing, sneezing, and touching contaminated objects.  SIGNS AND SYMPTOMS   · Cough.    · Fever.    · Coughing up mucus.    · Body aches.    · Chest congestion.    · Chills.    · Shortness of breath.    · Sore throat.    DIAGNOSIS   Acute bronchitis is usually diagnosed through a physical exam. Your health care provider will also ask you questions about your medical history. Tests, such as chest X-rays, are sometimes done to rule out other conditions.   TREATMENT   Acute bronchitis usually goes away in a couple weeks. Oftentimes, no medical treatment is necessary. Medicines are sometimes given for relief of fever or cough. Antibiotic medicines are usually not needed but may be prescribed in certain situations. In some cases, an inhaler may be recommended to help reduce shortness of breath and control the cough. A cool mist vaporizer may also be used to help thin bronchial secretions and make it easier to clear the chest.   HOME CARE INSTRUCTIONS  · Get plenty of rest.    · Drink enough fluids to keep your urine clear or pale yellow (unless you have a medical condition that requires fluid restriction). Increasing fluids may help thin your respiratory secretions (sputum) and reduce chest congestion, and it will prevent dehydration.    · Take medicines only as directed by your health care provider.  · If  you were prescribed an antibiotic medicine, finish it all even if you start to feel better.  · Avoid smoking and secondhand smoke. Exposure to cigarette smoke or irritating chemicals will make bronchitis worse. If you are a smoker, consider using nicotine gum or skin patches to help control withdrawal symptoms. Quitting smoking will help your lungs heal faster.    · Reduce the chances of another bout of acute bronchitis by washing your hands frequently, avoiding people with cold symptoms, and trying not to touch your hands to your mouth, nose, or eyes.    · Keep all follow-up visits as directed by your health care provider.    SEEK MEDICAL CARE IF:  Your symptoms do not improve after 1 week of treatment.   SEEK IMMEDIATE MEDICAL CARE IF:  · You develop an increased fever or chills.    · You have chest pain.    · You have severe shortness of breath.  · You have bloody sputum.    · You develop dehydration.  · You faint or repeatedly feel like you are going to pass out.  · You develop repeated vomiting.  · You develop a severe headache.  MAKE SURE YOU:   · Understand these instructions.  · Will watch your condition.  · Will get help right away if you are not doing well or get worse.     This information is not intended to replace advice given to you by your health care provider. Make sure you discuss any questions you have with your health care provider.     Document Released: 01/25/2006 Document Revised: 01/08/2016 Document Reviewed: 06/10/2014  [employer] Patient Education ©2017 [employer]

## 2018-02-01 ENCOUNTER — OFFICE VISIT (OUTPATIENT)
Dept: FAMILY MEDICINE CLINIC | Facility: CLINIC | Age: 58
End: 2018-02-01

## 2018-02-01 VITALS
SYSTOLIC BLOOD PRESSURE: 122 MMHG | HEART RATE: 68 BPM | RESPIRATION RATE: 16 BRPM | TEMPERATURE: 97.7 F | HEIGHT: 66 IN | DIASTOLIC BLOOD PRESSURE: 81 MMHG | WEIGHT: 194 LBS | BODY MASS INDEX: 31.18 KG/M2

## 2018-02-01 DIAGNOSIS — F32.5 MAJOR DEPRESSIVE DISORDER WITH SINGLE EPISODE, IN FULL REMISSION (HCC): ICD-10-CM

## 2018-02-01 DIAGNOSIS — E03.9 ACQUIRED HYPOTHYROIDISM: Primary | ICD-10-CM

## 2018-02-01 DIAGNOSIS — F32.1 MODERATE SINGLE CURRENT EPISODE OF MAJOR DEPRESSIVE DISORDER (HCC): ICD-10-CM

## 2018-02-01 DIAGNOSIS — Z13.6 SCREENING FOR ISCHEMIC HEART DISEASE: ICD-10-CM

## 2018-02-01 PROBLEM — J01.90 ACUTE NON-RECURRENT SINUSITIS: Status: RESOLVED | Noted: 2017-03-04 | Resolved: 2018-02-01

## 2018-02-01 PROBLEM — E66.811 OBESITY, CLASS I, BMI 30-34.9: Status: RESOLVED | Noted: 2017-04-13 | Resolved: 2018-02-01

## 2018-02-01 PROBLEM — E66.9 OBESITY, CLASS I, BMI 30-34.9: Status: RESOLVED | Noted: 2017-04-13 | Resolved: 2018-02-01

## 2018-02-01 PROBLEM — L98.7 EXCESS SKIN: Status: RESOLVED | Noted: 2017-07-13 | Resolved: 2018-02-01

## 2018-02-01 PROCEDURE — 99214 OFFICE O/P EST MOD 30 MIN: CPT | Performed by: FAMILY MEDICINE

## 2018-02-01 RX ORDER — OSELTAMIVIR PHOSPHATE 75 MG/1
CAPSULE ORAL
Refills: 0 | COMMUNITY
Start: 2018-01-25 | End: 2018-03-15

## 2018-02-01 RX ORDER — VENLAFAXINE HYDROCHLORIDE 150 MG/1
150 CAPSULE, EXTENDED RELEASE ORAL EVERY MORNING
Qty: 30 CAPSULE | Refills: 1 | Status: SHIPPED | OUTPATIENT
Start: 2018-02-01 | End: 2018-03-15 | Stop reason: SDUPTHER

## 2018-02-01 RX ORDER — LEVOTHYROXINE SODIUM 112 UG/1
112 TABLET ORAL EVERY MORNING
Qty: 30 TABLET | Refills: 1 | Status: SHIPPED | OUTPATIENT
Start: 2018-02-01 | End: 2018-03-15 | Stop reason: SDUPTHER

## 2018-02-01 NOTE — PROGRESS NOTES
"Chief Complaint   Patient presents with   • Hypothyroidism       Subjective     Elizabeth presents to the office today to refill her medications. No medication side effects are reported. Thyroid is stable pending labs.  She is finishing up on preventative treatment for influenza prevention.  She has mild URI symptoms but no fever.    Depression is not fully controlled with current therapy.  We will increase dose.  Please see PHQ 9 questionnaire below.     I have reviewed and updated her medications, medical history and problem list during today's office visit.        Social History   Substance Use Topics   • Smoking status: Never Smoker   • Smokeless tobacco: Never Used   • Alcohol use 0.6 oz/week     1 Cans of beer per week      Comment: 1-2 WEEK       Review of Systems   Constitutional: Positive for fatigue.   Cardiovascular: Negative for chest pain.   Gastrointestinal:        Poor appetitie   Psychiatric/Behavioral: Positive for dysphoric mood and sleep disturbance.       Objective   /81  Pulse 68  Temp 97.7 °F (36.5 °C) (Oral)   Resp 16  Ht 167.6 cm (66\")  Wt 88 kg (194 lb)  BMI 31.31 kg/m2  Body mass index is 31.31 kg/(m^2).  Physical Exam   Constitutional: She is cooperative. No distress.   Eyes: Conjunctivae and lids are normal.   Neck: Trachea normal and phonation normal. Carotid bruit is not present. No tracheal deviation present. No thyroid mass and no thyromegaly present.   Cardiovascular: Normal rate, regular rhythm and normal heart sounds.    No murmur heard.  Pulmonary/Chest: Effort normal and breath sounds normal.   Lymphadenopathy:     She has no cervical adenopathy.   Neurological: She is alert. She is not disoriented.   Skin: Skin is warm and dry.   Psychiatric: She has a normal mood and affect. Her speech is normal and behavior is normal.   Vitals reviewed.      Data Reviewed:        Assessment/Plan     Problem List Items Addressed This Visit        Endocrine    Acquired hypothyroidism - " Primary    Relevant Medications    levothyroxine (SYNTHROID, LEVOTHROID) 112 MCG tablet    Other Relevant Orders    TSH    T4, Free       Other    Moderate single current episode of major depressive disorder    Relevant Medications    venlafaxine XR (EFFEXOR-XR) 150 MG 24 hr capsule    Other Relevant Orders    Comprehensive metabolic panel    CBC and Differential      Other Visit Diagnoses     Major depressive disorder with single episode, in full remission        Relevant Medications    venlafaxine XR (EFFEXOR-XR) 150 MG 24 hr capsule    Screening for ischemic heart disease        Relevant Orders    Comprehensive metabolic panel    Lipid Panel With LDL/HDL Ratio    CBC and Differential          Outpatient Encounter Prescriptions as of 2/1/2018   Medication Sig Dispense Refill   • oseltamivir (TAMIFLU) 75 MG capsule TK ONE C PO  D  0   • levothyroxine (SYNTHROID, LEVOTHROID) 112 MCG tablet Take 1 tablet by mouth Every Morning for 60 days. 30 tablet 1   • venlafaxine XR (EFFEXOR-XR) 150 MG 24 hr capsule Take 1 capsule by mouth Every Morning for 60 days. 30 capsule 1   • [DISCONTINUED] azithromycin (ZITHROMAX) 250 MG tablet Take 2 tablets the first day, then 1 tablet daily for 4 days. 6 tablet 0   • [DISCONTINUED] benzonatate (TESSALON PERLES) 100 MG capsule 1 to 2 capsules 3 times a day 30 capsule 0   • [DISCONTINUED] brompheniramine-pseudoephedrine-DM (BROMFED DM) 30-2-10 MG/5ML syrup 5 to 10 cc every 4 hours as needed for cough, congestion, allergies 240 mL 0   • [DISCONTINUED] levothyroxine (SYNTHROID, LEVOTHROID) 112 MCG tablet Take 1 tablet by mouth Every Morning for 180 days. 30 tablet 5   • [DISCONTINUED] predniSONE (DELTASONE) 20 MG tablet Prednisone 20mg tabs, 3 for 3 days, 2 for 3 days, 1 for 3 days, 1/2 for 3 days take with food or milk 20 tablet 0   • [DISCONTINUED] venlafaxine XR (EFFEXOR-XR) 75 MG 24 hr capsule Take 1 capsule by mouth Every Morning for 180 days. 30 capsule 5     No facility-administered  encounter medications on file as of 2/1/2018.        Orders Placed This Encounter   Procedures   • Comprehensive metabolic panel     Standing Status:   Future     Standing Expiration Date:   7/31/2018   • Lipid Panel With LDL/HDL Ratio     Standing Status:   Future     Standing Expiration Date:   7/31/2018   • TSH     Standing Status:   Future     Standing Expiration Date:   7/31/2018   • T4, Free     Standing Status:   Future     Standing Expiration Date:   7/31/2018   • CBC and Differential     Standing Status:   Future     Standing Expiration Date:   7/31/2018     Order Specific Question:   Manual Differential     Answer:   No            F/U in 6 weeks

## 2018-02-15 ENCOUNTER — RESULTS ENCOUNTER (OUTPATIENT)
Dept: FAMILY MEDICINE CLINIC | Facility: CLINIC | Age: 58
End: 2018-02-15

## 2018-02-15 DIAGNOSIS — F32.1 MODERATE SINGLE CURRENT EPISODE OF MAJOR DEPRESSIVE DISORDER (HCC): ICD-10-CM

## 2018-02-15 DIAGNOSIS — Z13.6 SCREENING FOR ISCHEMIC HEART DISEASE: ICD-10-CM

## 2018-02-15 DIAGNOSIS — E03.9 ACQUIRED HYPOTHYROIDISM: ICD-10-CM

## 2018-03-15 ENCOUNTER — OFFICE VISIT (OUTPATIENT)
Dept: FAMILY MEDICINE CLINIC | Facility: CLINIC | Age: 58
End: 2018-03-15

## 2018-03-15 VITALS
DIASTOLIC BLOOD PRESSURE: 69 MMHG | TEMPERATURE: 97.8 F | SYSTOLIC BLOOD PRESSURE: 123 MMHG | BODY MASS INDEX: 31.18 KG/M2 | RESPIRATION RATE: 16 BRPM | HEART RATE: 62 BPM | WEIGHT: 194 LBS | HEIGHT: 66 IN

## 2018-03-15 DIAGNOSIS — F32.5 MAJOR DEPRESSIVE DISORDER WITH SINGLE EPISODE, IN FULL REMISSION (HCC): ICD-10-CM

## 2018-03-15 DIAGNOSIS — F32.1 MODERATE SINGLE CURRENT EPISODE OF MAJOR DEPRESSIVE DISORDER (HCC): ICD-10-CM

## 2018-03-15 DIAGNOSIS — E03.9 ACQUIRED HYPOTHYROIDISM: Primary | ICD-10-CM

## 2018-03-15 LAB
ALBUMIN SERPL-MCNC: 4.2 G/DL (ref 3.5–5.2)
ALBUMIN/GLOB SERPL: 1.3 G/DL
ALP SERPL-CCNC: 89 U/L (ref 39–117)
ALT SERPL-CCNC: 15 U/L (ref 1–33)
AST SERPL-CCNC: 16 U/L (ref 1–32)
BILIRUB SERPL-MCNC: 0.3 MG/DL (ref 0.1–1.2)
BUN SERPL-MCNC: 20 MG/DL (ref 6–20)
BUN/CREAT SERPL: 28.2 (ref 7–25)
CALCIUM SERPL-MCNC: 9.7 MG/DL (ref 8.6–10.5)
CHLORIDE SERPL-SCNC: 102 MMOL/L (ref 98–107)
CHOLEST SERPL-MCNC: 223 MG/DL (ref 0–200)
CO2 SERPL-SCNC: 24.4 MMOL/L (ref 22–29)
CREAT SERPL-MCNC: 0.71 MG/DL (ref 0.57–1)
GFR SERPLBLD CREATININE-BSD FMLA CKD-EPI: 104 ML/MIN/1.73
GFR SERPLBLD CREATININE-BSD FMLA CKD-EPI: 85 ML/MIN/1.73
GLOBULIN SER CALC-MCNC: 3.3 GM/DL
GLUCOSE SERPL-MCNC: 90 MG/DL (ref 65–99)
HCT VFR BLD AUTO: (no result) %
HDLC SERPL-MCNC: 43 MG/DL (ref 40–60)
HGB BLD-MCNC: (no result) G/DL
LDLC SERPL CALC-MCNC: 153 MG/DL (ref 0–100)
LDLC/HDLC SERPL: 3.55 {RATIO}
PLATELET # BLD AUTO: (no result) 10*3/UL
POTASSIUM SERPL-SCNC: 4.2 MMOL/L (ref 3.5–5.2)
PROT SERPL-MCNC: 7.5 G/DL (ref 6–8.5)
RBC # BLD AUTO: (no result) 10*6/UL
REQUEST PROBLEM: NORMAL
SODIUM SERPL-SCNC: 139 MMOL/L (ref 136–145)
T4 FREE SERPL-MCNC: 1.83 NG/DL (ref 0.93–1.7)
TRIGL SERPL-MCNC: 137 MG/DL (ref 0–150)
TSH SERPL DL<=0.005 MIU/L-ACNC: 0.09 MIU/ML (ref 0.27–4.2)
VLDLC SERPL CALC-MCNC: 27.4 MG/DL (ref 5–40)
WBC # BLD AUTO: (no result) 10*3/MM3

## 2018-03-15 PROCEDURE — 99213 OFFICE O/P EST LOW 20 MIN: CPT | Performed by: FAMILY MEDICINE

## 2018-03-15 RX ORDER — LEVOTHYROXINE SODIUM 112 UG/1
112 TABLET ORAL EVERY MORNING
Qty: 30 TABLET | Refills: 5 | Status: SHIPPED | OUTPATIENT
Start: 2018-03-15 | End: 2018-03-22 | Stop reason: SDUPTHER

## 2018-03-15 RX ORDER — VENLAFAXINE HYDROCHLORIDE 150 MG/1
150 CAPSULE, EXTENDED RELEASE ORAL EVERY MORNING
Qty: 30 CAPSULE | Refills: 5 | Status: SHIPPED | OUTPATIENT
Start: 2018-03-15 | End: 2018-09-13 | Stop reason: SDUPTHER

## 2018-03-15 NOTE — PROGRESS NOTES
"Chief Complaint   Patient presents with   • Hypothyroidism       Subjective   Elizabeth Montiel presents to the office today to refill her medications and review recent labs. No medication side effects are reported. Her thyroid is stable pending lab results.  She had labs drawn yesterday morning.  Depression is controlled on current dose of venlafaxine.  No side effects with increased dose noted.   PHQ-9 Total Score: 2   (previously score was 20)    I have reviewed and updated her medications, medical history and problem list during today's office visit.     Assessment/Plan   Problem List Items Addressed This Visit        Endocrine    Acquired hypothyroidism - Primary    Relevant Medications    levothyroxine (SYNTHROID, LEVOTHROID) 112 MCG tablet       Other    Moderate single current episode of major depressive disorder    Relevant Medications    venlafaxine XR (EFFEXOR-XR) 150 MG 24 hr capsule      Other Visit Diagnoses     Major depressive disorder with single episode, in full remission        Relevant Medications    venlafaxine XR (EFFEXOR-XR) 150 MG 24 hr capsule        F/U in 6 months       Review of Systems   Psychiatric/Behavioral: Negative for dysphoric mood.     Objective   /69   Pulse 62   Temp 97.8 °F (36.6 °C) (Oral)   Resp 16   Ht 167.6 cm (66\")   Wt 88 kg (194 lb)   BMI 31.31 kg/m²   Body mass index is 31.31 kg/m².  Physical Exam   Constitutional: She is cooperative. No distress.   Eyes: Conjunctivae and lids are normal.   Neck: Trachea normal and phonation normal. Carotid bruit is not present. No tracheal deviation present. No thyroid mass and no thyromegaly present.   Cardiovascular: Normal rate, regular rhythm and normal heart sounds.    No murmur heard.  Pulmonary/Chest: Effort normal and breath sounds normal.   Lymphadenopathy:     She has no cervical adenopathy.   Neurological: She is alert. She is not disoriented.   Skin: Skin is warm and dry.   Psychiatric: She has a normal mood and " affect. Her speech is normal and behavior is normal. She is attentive.   Vitals reviewed.       Social History   Substance Use Topics   • Smoking status: Never Smoker   • Smokeless tobacco: Never Used   • Alcohol use 0.6 oz/week     1 Cans of beer per week      Comment: 1-2 WEEK       Data Reviewed:    No radiology results for the last 30 days.        No orders of the defined types were placed in this encounter.

## 2018-03-22 ENCOUNTER — TELEPHONE (OUTPATIENT)
Dept: FAMILY MEDICINE CLINIC | Facility: CLINIC | Age: 58
End: 2018-03-22

## 2018-03-22 DIAGNOSIS — E78.5 HYPERLIPIDEMIA, UNSPECIFIED HYPERLIPIDEMIA TYPE: Primary | ICD-10-CM

## 2018-03-22 DIAGNOSIS — E03.9 ACQUIRED HYPOTHYROIDISM: ICD-10-CM

## 2018-03-22 RX ORDER — LEVOTHYROXINE SODIUM 0.1 MG/1
100 TABLET ORAL EVERY MORNING
Qty: 30 TABLET | Refills: 5 | Status: SHIPPED | OUTPATIENT
Start: 2018-03-22 | End: 2018-09-13 | Stop reason: SDUPTHER

## 2018-03-22 RX ORDER — ROSUVASTATIN CALCIUM 5 MG/1
5 TABLET, COATED ORAL DAILY
Qty: 30 TABLET | Refills: 1 | Status: SHIPPED | OUTPATIENT
Start: 2018-03-22 | End: 2018-05-16 | Stop reason: SDUPTHER

## 2018-03-22 NOTE — TELEPHONE ENCOUNTER
----- Message from David Giordano MD sent at 3/15/2018  4:48 PM EDT -----  Please call the patient regarding her abnormal result. Send her a prescription of rosuvastatin 5 mg #30 with one refill. to her pharmacy of choice. Call her to let her know. Appointment in 6 weeks.

## 2018-04-12 ENCOUNTER — RESULTS ENCOUNTER (OUTPATIENT)
Dept: FAMILY MEDICINE CLINIC | Facility: CLINIC | Age: 58
End: 2018-04-12

## 2018-04-12 DIAGNOSIS — E78.5 HYPERLIPIDEMIA, UNSPECIFIED HYPERLIPIDEMIA TYPE: ICD-10-CM

## 2018-05-03 ENCOUNTER — RESULTS ENCOUNTER (OUTPATIENT)
Dept: FAMILY MEDICINE CLINIC | Facility: CLINIC | Age: 58
End: 2018-05-03

## 2018-05-03 DIAGNOSIS — E03.9 ACQUIRED HYPOTHYROIDISM: ICD-10-CM

## 2018-05-16 DIAGNOSIS — E78.5 HYPERLIPIDEMIA, UNSPECIFIED HYPERLIPIDEMIA TYPE: ICD-10-CM

## 2018-05-16 RX ORDER — ROSUVASTATIN CALCIUM 5 MG/1
5 TABLET, COATED ORAL DAILY
Qty: 30 TABLET | Refills: 2 | Status: SHIPPED | OUTPATIENT
Start: 2018-05-16 | End: 2018-08-20 | Stop reason: SDUPTHER

## 2018-08-20 DIAGNOSIS — E78.5 HYPERLIPIDEMIA, UNSPECIFIED HYPERLIPIDEMIA TYPE: ICD-10-CM

## 2018-08-21 RX ORDER — ROSUVASTATIN CALCIUM 5 MG/1
5 TABLET, COATED ORAL DAILY
Qty: 30 TABLET | Refills: 0 | Status: SHIPPED | OUTPATIENT
Start: 2018-08-21 | End: 2018-09-13 | Stop reason: SDUPTHER

## 2018-09-13 ENCOUNTER — OFFICE VISIT (OUTPATIENT)
Dept: FAMILY MEDICINE CLINIC | Facility: CLINIC | Age: 58
End: 2018-09-13

## 2018-09-13 VITALS
TEMPERATURE: 97.3 F | BODY MASS INDEX: 35.36 KG/M2 | HEIGHT: 66 IN | HEART RATE: 62 BPM | DIASTOLIC BLOOD PRESSURE: 70 MMHG | SYSTOLIC BLOOD PRESSURE: 113 MMHG | RESPIRATION RATE: 16 BRPM | WEIGHT: 220 LBS

## 2018-09-13 DIAGNOSIS — E78.5 HYPERLIPIDEMIA, UNSPECIFIED HYPERLIPIDEMIA TYPE: ICD-10-CM

## 2018-09-13 DIAGNOSIS — E78.00 PURE HYPERCHOLESTEROLEMIA: ICD-10-CM

## 2018-09-13 DIAGNOSIS — E03.9 ACQUIRED HYPOTHYROIDISM: Primary | ICD-10-CM

## 2018-09-13 DIAGNOSIS — Z11.59 NEED FOR HEPATITIS C SCREENING TEST: ICD-10-CM

## 2018-09-13 DIAGNOSIS — F32.5 MAJOR DEPRESSIVE DISORDER WITH SINGLE EPISODE, IN FULL REMISSION (HCC): ICD-10-CM

## 2018-09-13 PROBLEM — Z90.3 HISTORY OF SLEEVE GASTRECTOMY: Status: ACTIVE | Noted: 2018-09-13

## 2018-09-13 PROCEDURE — 99214 OFFICE O/P EST MOD 30 MIN: CPT | Performed by: FAMILY MEDICINE

## 2018-09-13 RX ORDER — VENLAFAXINE HYDROCHLORIDE 150 MG/1
150 CAPSULE, EXTENDED RELEASE ORAL EVERY MORNING
Qty: 30 CAPSULE | Refills: 5 | Status: SHIPPED | OUTPATIENT
Start: 2018-09-13 | End: 2019-03-13 | Stop reason: SDUPTHER

## 2018-09-13 RX ORDER — ROSUVASTATIN CALCIUM 5 MG/1
5 TABLET, COATED ORAL DAILY
Qty: 30 TABLET | Refills: 5 | Status: SHIPPED | OUTPATIENT
Start: 2018-09-13 | End: 2019-03-13 | Stop reason: SDUPTHER

## 2018-09-13 RX ORDER — LEVOTHYROXINE SODIUM 0.1 MG/1
100 TABLET ORAL EVERY MORNING
Qty: 30 TABLET | Refills: 5 | Status: SHIPPED | OUTPATIENT
Start: 2018-09-13 | End: 2018-09-17 | Stop reason: DRUGHIGH

## 2018-09-13 NOTE — PROGRESS NOTES
"Chief Complaint   Patient presents with   • Hypothyroidism   • Hyperlipidemia       Subjective     Elizabeth Montiel presents to the office today to refill her medications and review recent labs. No medication side effects are reported. Weight is stable pending lab results.  Depression is in full remission with current therapy.  Cholesterol stable pending lab results.  We talked about immunization needs.  Overall, she feels well.     I have reviewed and updated her medications, medical history and problem list during today's office visit.      Patient Care Team:  Daivd Giordano MD as PCP - General (Family Medicine)    Social History   Substance Use Topics   • Smoking status: Never Smoker   • Smokeless tobacco: Never Used   • Alcohol use 0.6 oz/week     1 Cans of beer per week      Comment: 1-2 WEEK       Review of Systems   Constitutional: Negative for fatigue.   Cardiovascular: Negative for chest pain.   Allergic/Immunologic: Positive for environmental allergies.       Objective     /70   Pulse 62   Temp 97.3 °F (36.3 °C) (Oral)   Resp 16   Ht 167.6 cm (66\")   Wt 99.8 kg (220 lb)   BMI 35.51 kg/m²     Body mass index is 35.51 kg/m².    Physical Exam   Constitutional: She is oriented to person, place, and time. She appears well-developed. No distress.   Eyes: Conjunctivae and lids are normal.   Neck: Carotid bruit is not present.   Cardiovascular: Normal rate, regular rhythm and normal heart sounds.    Pulmonary/Chest: Effort normal and breath sounds normal.   Neurological: She is alert and oriented to person, place, and time.   Skin: Skin is warm and dry.   Psychiatric: She has a normal mood and affect. Her behavior is normal.   Vitals reviewed.      Data Reviewed:         CMP:  Lab Results   Component Value Date    GLU 90 03/14/2018    BUN 20 03/14/2018    CREATININE 0.71 03/14/2018    EGFRIFNONA 85 03/14/2018    EGFRIFAFRI 104 03/14/2018     03/14/2018    K 4.2 03/14/2018     03/14/2018    " CALCIUM 9.7 03/14/2018    PROTENTOTREF 7.5 03/14/2018    ALBUMIN 4.20 03/14/2018    LABGLOBREF 3.3 03/14/2018    BILITOT 0.3 03/14/2018    ALKPHOS 89 03/14/2018    AST 16 03/14/2018    ALT 15 03/14/2018     CBC w/ diff:   Lab Results   Component Value Date    WBC CANCELED 03/14/2018    RBC CANCELED 03/14/2018    HGB CANCELED 03/14/2018    HCT CANCELED 03/14/2018    MCV 88.7 07/13/2017    MCH 28.9 07/13/2017    MCHC 32.6 07/13/2017    RDW 13.2 (H) 07/13/2017    PLT CANCELED 03/14/2018    NEUTRORELPCT 44.5 07/13/2017    AUTOIGPER 0.0 07/13/2017    LYMPHORELPCT 43.8 07/13/2017    MONORELPCT 8.0 07/13/2017    EOSRELPCT 2.8 07/13/2017    BASORELPCT 0.9 07/13/2017     LIPID PANEL:  Lab Results   Component Value Date    CHLPL 223 (H) 03/14/2018    TRIG 137 03/14/2018    HDL 43 03/14/2018    VLDL 27.4 03/14/2018     (H) 03/14/2018    LDLHDL 3.55 03/14/2018     TSH:  Lab Results   Component Value Date    TSH 0.089 (L) 03/14/2018       Assessment/Plan     Problem List Items Addressed This Visit     Acquired hypothyroidism - Primary     The current medical regimen is effective;  continue present plan and medications.           Relevant Medications    levothyroxine (SYNTHROID, LEVOTHROID) 100 MCG tablet    Other Relevant Orders    TSH    Major depressive disorder with single episode, in full remission (CMS/HCC)     Psychological condition is unchanged.  Continue current treatment regimen.  Psychological condition  will be reassessed at the next regular appointment.         Relevant Medications    venlafaxine XR (EFFEXOR-XR) 150 MG 24 hr capsule    Pure hypercholesterolemia     Lipid abnormalities are improving with treatment.  Pharmacotherapy as ordered.  Lipids will be reassessed in 6 months.         Relevant Medications    rosuvastatin (CRESTOR) 5 MG tablet    Other Relevant Orders    Comprehensive metabolic panel    Lipid Panel With LDL/HDL Ratio    CBC and Differential      Other Visit Diagnoses     Hyperlipidemia,  unspecified hyperlipidemia type        Relevant Medications    rosuvastatin (CRESTOR) 5 MG tablet    Need for hepatitis C screening test        Relevant Orders    Hepatitis C Antibody          Orders Placed This Encounter   Procedures   • Comprehensive metabolic panel     Standing Status:   Future     Standing Expiration Date:   6/10/2019   • Lipid Panel With LDL/HDL Ratio     Standing Status:   Future     Standing Expiration Date:   6/10/2019   • TSH     Standing Status:   Future     Standing Expiration Date:   6/10/2019   • Hepatitis C Antibody     Standing Status:   Future     Standing Expiration Date:   9/13/2019   • CBC and Differential     Standing Status:   Future     Standing Expiration Date:   6/10/2019     Order Specific Question:   Manual Differential     Answer:   No         Current Outpatient Prescriptions:   •  levothyroxine (SYNTHROID, LEVOTHROID) 100 MCG tablet, Take 1 tablet by mouth Every Morning for 180 days., Disp: 30 tablet, Rfl: 5  •  rosuvastatin (CRESTOR) 5 MG tablet, Take 1 tablet by mouth Daily for 180 days., Disp: 30 tablet, Rfl: 5  •  venlafaxine XR (EFFEXOR-XR) 150 MG 24 hr capsule, Take 1 capsule by mouth Every Morning for 180 days., Disp: 30 capsule, Rfl: 5    Return in about 6 months (around 3/13/2019) for Recheck.

## 2018-09-14 LAB
CHOLEST SERPL-MCNC: 157 MG/DL (ref 0–200)
HDLC SERPL-MCNC: 48 MG/DL (ref 40–60)
LDLC SERPL CALC-MCNC: 90 MG/DL (ref 0–100)
LDLC/HDLC SERPL: 1.88 {RATIO}
T4 FREE SERPL-MCNC: 1.51 NG/DL (ref 0.93–1.7)
TRIGL SERPL-MCNC: 93 MG/DL (ref 0–150)
TSH SERPL DL<=0.005 MIU/L-ACNC: 0.18 MIU/ML (ref 0.27–4.2)
VLDLC SERPL CALC-MCNC: 18.6 MG/DL (ref 5–40)

## 2018-09-17 DIAGNOSIS — E03.9 ACQUIRED HYPOTHYROIDISM: Primary | ICD-10-CM

## 2018-09-17 RX ORDER — LEVOTHYROXINE SODIUM 88 MCG
88 TABLET ORAL DAILY
Qty: 30 TABLET | Refills: 5 | Status: SHIPPED | OUTPATIENT
Start: 2018-09-17 | End: 2019-03-13 | Stop reason: SDUPTHER

## 2018-10-17 ENCOUNTER — RESULTS ENCOUNTER (OUTPATIENT)
Dept: FAMILY MEDICINE CLINIC | Facility: CLINIC | Age: 58
End: 2018-10-17

## 2018-10-17 DIAGNOSIS — E03.9 ACQUIRED HYPOTHYROIDISM: ICD-10-CM

## 2019-02-10 ENCOUNTER — RESULTS ENCOUNTER (OUTPATIENT)
Dept: FAMILY MEDICINE CLINIC | Facility: CLINIC | Age: 59
End: 2019-02-10

## 2019-02-10 DIAGNOSIS — E78.00 PURE HYPERCHOLESTEROLEMIA: ICD-10-CM

## 2019-02-10 DIAGNOSIS — Z11.59 NEED FOR HEPATITIS C SCREENING TEST: ICD-10-CM

## 2019-02-10 DIAGNOSIS — E03.9 ACQUIRED HYPOTHYROIDISM: ICD-10-CM

## 2019-03-01 LAB
ALBUMIN SERPL-MCNC: 4.3 G/DL (ref 3.5–5.2)
ALBUMIN/GLOB SERPL: 1.3 G/DL
ALP SERPL-CCNC: 85 U/L (ref 39–117)
ALT SERPL-CCNC: 20 U/L (ref 1–33)
AST SERPL-CCNC: 18 U/L (ref 1–32)
BASOPHILS # BLD AUTO: 0.06 10*3/MM3 (ref 0–0.2)
BASOPHILS NFR BLD AUTO: 0.9 % (ref 0–1.5)
BILIRUB SERPL-MCNC: 0.3 MG/DL (ref 0.1–1.2)
BUN SERPL-MCNC: 15 MG/DL (ref 6–20)
BUN/CREAT SERPL: 18.5 (ref 7–25)
CALCIUM SERPL-MCNC: 9.6 MG/DL (ref 8.6–10.5)
CHLORIDE SERPL-SCNC: 105 MMOL/L (ref 98–107)
CHOLEST SERPL-MCNC: 158 MG/DL (ref 0–200)
CO2 SERPL-SCNC: 25.3 MMOL/L (ref 22–29)
CREAT SERPL-MCNC: 0.81 MG/DL (ref 0.57–1)
EOSINOPHIL # BLD AUTO: 0.25 10*3/MM3 (ref 0–0.4)
EOSINOPHIL NFR BLD AUTO: 3.7 % (ref 0.3–6.2)
ERYTHROCYTE [DISTWIDTH] IN BLOOD BY AUTOMATED COUNT: 14.5 % (ref 12.3–15.4)
GLOBULIN SER CALC-MCNC: 3.3 GM/DL
GLUCOSE SERPL-MCNC: 98 MG/DL (ref 65–99)
HCT VFR BLD AUTO: 42.3 % (ref 34–46.6)
HCV AB S/CO SERPL IA: 0.1 S/CO RATIO (ref 0–0.9)
HDLC SERPL-MCNC: 46 MG/DL (ref 40–60)
HGB BLD-MCNC: 12.9 G/DL (ref 12–15.9)
IMM GRANULOCYTES # BLD AUTO: 0.02 10*3/MM3 (ref 0–0.05)
IMM GRANULOCYTES NFR BLD AUTO: 0.3 % (ref 0–0.5)
LDLC SERPL CALC-MCNC: 92 MG/DL (ref 0–100)
LDLC/HDLC SERPL: 2 {RATIO}
LYMPHOCYTES # BLD AUTO: 2.67 10*3/MM3 (ref 0.7–3.1)
LYMPHOCYTES NFR BLD AUTO: 39 % (ref 19.6–45.3)
MCH RBC QN AUTO: 27.3 PG (ref 26.6–33)
MCHC RBC AUTO-ENTMCNC: 30.5 G/DL (ref 31.5–35.7)
MCV RBC AUTO: 89.4 FL (ref 79–97)
MONOCYTES # BLD AUTO: 0.54 10*3/MM3 (ref 0.1–0.9)
MONOCYTES NFR BLD AUTO: 7.9 % (ref 5–12)
NEUTROPHILS # BLD AUTO: 3.3 10*3/MM3 (ref 1.4–7)
NEUTROPHILS NFR BLD AUTO: 48.2 % (ref 42.7–76)
NRBC BLD AUTO-RTO: 0 /100 WBC (ref 0–0)
PLATELET # BLD AUTO: 266 10*3/MM3 (ref 140–450)
POTASSIUM SERPL-SCNC: 4.2 MMOL/L (ref 3.5–5.2)
PROT SERPL-MCNC: 7.6 G/DL (ref 6–8.5)
RBC # BLD AUTO: 4.73 10*6/MM3 (ref 3.77–5.28)
SODIUM SERPL-SCNC: 143 MMOL/L (ref 136–145)
TRIGL SERPL-MCNC: 101 MG/DL (ref 0–150)
TSH SERPL DL<=0.005 MIU/L-ACNC: 2.04 MIU/ML (ref 0.27–4.2)
VLDLC SERPL CALC-MCNC: 20.2 MG/DL (ref 5–40)
WBC # BLD AUTO: 6.84 10*3/MM3 (ref 3.4–10.8)

## 2019-03-13 ENCOUNTER — OFFICE VISIT (OUTPATIENT)
Dept: FAMILY MEDICINE CLINIC | Facility: CLINIC | Age: 59
End: 2019-03-13

## 2019-03-13 VITALS
RESPIRATION RATE: 16 BRPM | HEART RATE: 70 BPM | WEIGHT: 241 LBS | BODY MASS INDEX: 38.73 KG/M2 | HEIGHT: 66 IN | DIASTOLIC BLOOD PRESSURE: 79 MMHG | TEMPERATURE: 98.5 F | SYSTOLIC BLOOD PRESSURE: 127 MMHG

## 2019-03-13 DIAGNOSIS — E03.9 ACQUIRED HYPOTHYROIDISM: ICD-10-CM

## 2019-03-13 DIAGNOSIS — F32.5 MAJOR DEPRESSIVE DISORDER WITH SINGLE EPISODE, IN FULL REMISSION (HCC): ICD-10-CM

## 2019-03-13 DIAGNOSIS — E78.00 PURE HYPERCHOLESTEROLEMIA: Primary | ICD-10-CM

## 2019-03-13 PROCEDURE — 99214 OFFICE O/P EST MOD 30 MIN: CPT | Performed by: FAMILY MEDICINE

## 2019-03-13 RX ORDER — ROSUVASTATIN CALCIUM 5 MG/1
5 TABLET, COATED ORAL DAILY
Qty: 30 TABLET | Refills: 5 | Status: SHIPPED | OUTPATIENT
Start: 2019-03-13 | End: 2019-09-13 | Stop reason: SDUPTHER

## 2019-03-13 RX ORDER — VENLAFAXINE HYDROCHLORIDE 150 MG/1
150 CAPSULE, EXTENDED RELEASE ORAL EVERY MORNING
Qty: 30 CAPSULE | Refills: 5 | Status: SHIPPED | OUTPATIENT
Start: 2019-03-13 | End: 2019-09-13 | Stop reason: SDUPTHER

## 2019-03-13 RX ORDER — LEVOTHYROXINE SODIUM 88 MCG
88 TABLET ORAL DAILY
Qty: 30 TABLET | Refills: 5 | Status: SHIPPED | OUTPATIENT
Start: 2019-03-13 | End: 2019-09-13 | Stop reason: SDUPTHER

## 2019-03-13 NOTE — PROGRESS NOTES
"Chief Complaint   Patient presents with   • Hypothyroidism   • Hyperlipidemia       Subjective     Elizabeth Montiel presents to the office today to refill her medications and review recent labs. No medication side effects are reported.  Thyroid is back to normal on current therapy.  Cholesterol is stable.  Depression is controlled on current therapy.  Overall, she feels well.    I have reviewed and updated her medications, medical history and problem list during today's office visit.      Patient Care Team:  David Giordano MD as PCP - General (Family Medicine)    Social History     Tobacco Use   • Smoking status: Never Smoker   • Smokeless tobacco: Never Used   Substance Use Topics   • Alcohol use: Yes     Alcohol/week: 0.6 oz     Types: 1 Cans of beer per week     Comment: 1-2 WEEK       Review of Systems   Constitutional: Negative for fatigue.   Cardiovascular: Negative for chest pain.       Objective     /79   Pulse 70   Temp 98.5 °F (36.9 °C) (Oral)   Resp 16   Ht 167.6 cm (66\")   Wt 109 kg (241 lb)   BMI 38.90 kg/m²     Body mass index is 38.9 kg/m².    Physical Exam   Constitutional: She is oriented to person, place, and time. She appears well-developed. No distress.   Eyes: Conjunctivae and lids are normal.   Neck: Carotid bruit is not present.   Cardiovascular: Normal rate, regular rhythm and normal heart sounds.   Pulmonary/Chest: Effort normal and breath sounds normal.   Neurological: She is alert and oriented to person, place, and time.   Skin: Skin is warm and dry.   Psychiatric: She has a normal mood and affect. Her behavior is normal.   Vitals reviewed.      Data Reviewed:             Lab Results   Component Value Date    GLU 98 02/28/2019    BUN 15 02/28/2019    BUN 15 07/13/2017    CREATININE 0.81 02/28/2019    CREATININE 0.82 07/13/2017    EGFRIFNONA 73 02/28/2019    EGFRIFNONA 73 07/13/2017    EGFRIFAFRI 89 02/28/2019     02/28/2019     07/13/2017    K 4.2 02/28/2019    K 4.0 " 07/13/2017     02/28/2019     07/13/2017    CO2 25.3 02/28/2019    CO2 26.7 07/13/2017    CALCIUM 9.6 02/28/2019    CALCIUM 10.2 07/13/2017    ALBUMIN 4.30 02/28/2019    ALBUMIN 4.30 07/13/2017    LABGLOBREF 3.3 02/28/2019    BILITOT 0.3 02/28/2019    BILITOT 0.6 07/13/2017    ALKPHOS 85 02/28/2019    ALKPHOS 78 07/13/2017    AST 18 02/28/2019    AST 23 07/13/2017    ALT 20 02/28/2019    ALT 18 07/13/2017    CHLPL 158 02/28/2019    TRIG 101 02/28/2019    TRIG 116 07/13/2017    HDL 46 02/28/2019    HDL 50 07/13/2017    VLDL 20.2 02/28/2019    VLDL 23.2 07/13/2017    LDL 92 02/28/2019     (H) 07/13/2017    LDLHDL 2.00 02/28/2019    LDLHDL 3.36 07/13/2017    HGBA1C 5.31 07/13/2017    WBC 6.84 02/28/2019    RBC 4.73 02/28/2019    HCT 42.3 02/28/2019    HCT 41.7 07/13/2017    MCV 89.4 02/28/2019    MCV 88.7 07/13/2017    MCH 27.3 02/28/2019    MCH 28.9 07/13/2017    MCHC 30.5 (L) 02/28/2019    MCHC 32.6 07/13/2017    RDW 14.5 02/28/2019    RDW 13.2 (H) 07/13/2017    TSH 2.040 02/28/2019    TSH 0.203 (L) 07/13/2017          Assessment/Plan     Problem List Items Addressed This Visit     Acquired hypothyroidism     The current medical regimen is effective;  continue present plan and medications.           Relevant Medications    SYNTHROID 88 MCG tablet    Other Relevant Orders    TSH    Major depressive disorder with single episode, in full remission (CMS/HCC)     Psychological condition is unchanged.  Continue current treatment regimen.  Psychological condition  will be reassessed at the next regular appointment.         Relevant Medications    venlafaxine XR (EFFEXOR-XR) 150 MG 24 hr capsule    Pure hypercholesterolemia - Primary     Lipid abnormalities are unchanged.  Pharmacotherapy as ordered.  Lipids will be reassessed in 6 months.         Relevant Medications    rosuvastatin (CRESTOR) 5 MG tablet    Other Relevant Orders    Comprehensive Metabolic Panel    CBC & Differential    Lipid Panel With /  Chol / HDL Ratio    CK          Orders Placed This Encounter   Procedures   • Comprehensive Metabolic Panel     Standing Status:   Future     Standing Expiration Date:   12/8/2019   • Lipid Panel With / Chol / HDL Ratio     Standing Status:   Future     Standing Expiration Date:   12/8/2019   • CK     Standing Status:   Future     Standing Expiration Date:   12/8/2019   • TSH     Standing Status:   Future     Standing Expiration Date:   12/8/2019   • CBC & Differential     Standing Status:   Future     Standing Expiration Date:   12/8/2019     Order Specific Question:   Manual Differential     Answer:   No         Current Outpatient Medications:   •  SYNTHROID 88 MCG tablet, Take 1 tablet by mouth Daily for 180 days., Disp: 30 tablet, Rfl: 5  •  rosuvastatin (CRESTOR) 5 MG tablet, Take 1 tablet by mouth Daily for 180 days., Disp: 30 tablet, Rfl: 5  •  venlafaxine XR (EFFEXOR-XR) 150 MG 24 hr capsule, Take 1 capsule by mouth Every Morning for 180 days., Disp: 30 capsule, Rfl: 5    Return in about 6 months (around 9/13/2019) for Recheck.

## 2019-04-08 ENCOUNTER — OFFICE VISIT (OUTPATIENT)
Dept: RETAIL CLINIC | Facility: CLINIC | Age: 59
End: 2019-04-08

## 2019-04-08 VITALS
SYSTOLIC BLOOD PRESSURE: 120 MMHG | HEART RATE: 84 BPM | DIASTOLIC BLOOD PRESSURE: 62 MMHG | RESPIRATION RATE: 18 BRPM | TEMPERATURE: 98.1 F | OXYGEN SATURATION: 98 %

## 2019-04-08 DIAGNOSIS — H66.001 ACUTE SUPPURATIVE OTITIS MEDIA OF RIGHT EAR WITHOUT SPONTANEOUS RUPTURE OF TYMPANIC MEMBRANE, RECURRENCE NOT SPECIFIED: ICD-10-CM

## 2019-04-08 DIAGNOSIS — J01.40 ACUTE PANSINUSITIS, RECURRENCE NOT SPECIFIED: Primary | ICD-10-CM

## 2019-04-08 PROCEDURE — 99213 OFFICE O/P EST LOW 20 MIN: CPT | Performed by: NURSE PRACTITIONER

## 2019-04-08 RX ORDER — FAMOTIDINE 10 MG
10 TABLET ORAL 2 TIMES DAILY
COMMUNITY

## 2019-04-08 RX ORDER — FLUTICASONE PROPIONATE 50 MCG
2 SPRAY, SUSPENSION (ML) NASAL 2 TIMES DAILY
Qty: 15.8 ML | Refills: 0 | Status: SHIPPED | OUTPATIENT
Start: 2019-04-08 | End: 2020-04-23

## 2019-04-08 RX ORDER — AZITHROMYCIN 250 MG/1
TABLET, FILM COATED ORAL
Qty: 6 TABLET | Refills: 0 | Status: SHIPPED | OUTPATIENT
Start: 2019-04-08 | End: 2019-05-24

## 2019-04-08 RX ORDER — LORATADINE 10 MG/1
CAPSULE, LIQUID FILLED ORAL
COMMUNITY
End: 2021-04-28 | Stop reason: ALTCHOICE

## 2019-04-08 NOTE — PROGRESS NOTES
Subjective   Elizabeth Montiel is a 58 y.o. female.     URI    This is a new problem. The current episode started in the past 7 days (new dizziness started today). The problem has been gradually worsening. There has been no fever. Associated symptoms include congestion, coughing, headaches, nausea, rhinorrhea and sinus pain. Pertinent negatives include no dysuria, sore throat or vomiting. Treatments tried: claritin. The treatment provided no relief.        The following portions of the patient's history were reviewed and updated as appropriate: allergies, current medications, past family history, past medical history, past social history, past surgical history and problem list.    Review of Systems   HENT: Positive for congestion and rhinorrhea. Negative for sore throat.    Respiratory: Positive for cough.    Cardiovascular: Negative.    Gastrointestinal: Positive for nausea. Negative for vomiting.   Genitourinary: Negative for dysuria.   Musculoskeletal: Negative.    Skin: Negative.    Neurological: Positive for dizziness and headache.       Objective   Physical Exam   Constitutional: She is cooperative. No distress.   HENT:   Head: Normocephalic.   Right Ear: Hearing, external ear and ear canal normal. A middle ear effusion is present.   Left Ear: Hearing, external ear and ear canal normal. A middle ear effusion is present.   Nose: Mucosal edema, sinus tenderness and congestion present.   Mouth/Throat: Posterior oropharyngeal edema and posterior oropharyngeal erythema present.   Eyes: Conjunctivae, EOM and lids are normal. Pupils are equal, round, and reactive to light.   Neck: Trachea normal and full passive range of motion without pain.   Cardiovascular: Normal rate, regular rhythm and normal pulses.   Pulmonary/Chest: Effort normal and breath sounds normal.   Lymphadenopathy:     She has cervical adenopathy.        Right cervical: Superficial cervical adenopathy present.   Neurological: She is alert.   Skin: Skin  is warm. Capillary refill takes less than 2 seconds.   Psychiatric: She has a normal mood and affect. Her speech is normal and behavior is normal.   Vitals reviewed.        Assessment/Plan   Elizabeth was seen today for uri.    Diagnoses and all orders for this visit:    Acute pansinusitis, recurrence not specified    Acute suppurative otitis media of right ear without spontaneous rupture of tympanic membrane, recurrence not specified    Other orders  -     fluticasone (FLONASE) 50 MCG/ACT nasal spray; 2 sprays into the nostril(s) as directed by provider 2 (Two) Times a Day.  -     azithromycin (ZITHROMAX Z-BERTO) 250 MG tablet; Take 2 tablets the first day, then 1 tablet daily for 4 days.

## 2019-04-08 NOTE — PATIENT INSTRUCTIONS

## 2019-05-24 ENCOUNTER — OFFICE VISIT (OUTPATIENT)
Dept: RETAIL CLINIC | Facility: CLINIC | Age: 59
End: 2019-05-24

## 2019-05-24 VITALS
HEART RATE: 82 BPM | TEMPERATURE: 97.9 F | DIASTOLIC BLOOD PRESSURE: 80 MMHG | OXYGEN SATURATION: 97 % | RESPIRATION RATE: 18 BRPM | SYSTOLIC BLOOD PRESSURE: 138 MMHG

## 2019-05-24 DIAGNOSIS — J01.40 ACUTE PANSINUSITIS, RECURRENCE NOT SPECIFIED: Primary | ICD-10-CM

## 2019-05-24 PROCEDURE — 99213 OFFICE O/P EST LOW 20 MIN: CPT | Performed by: NURSE PRACTITIONER

## 2019-05-24 RX ORDER — AZITHROMYCIN 250 MG/1
TABLET, FILM COATED ORAL
Qty: 6 TABLET | Refills: 0 | Status: SHIPPED | OUTPATIENT
Start: 2019-05-24 | End: 2019-10-17

## 2019-05-24 NOTE — PATIENT INSTRUCTIONS

## 2019-05-24 NOTE — PROGRESS NOTES
Subjective   Elizabeth Montiel is a 58 y.o. female.     URI    This is a new problem. The current episode started in the past 7 days. There has been no fever. Associated symptoms include congestion, coughing, ear pain, headaches, neck pain, rhinorrhea, sinus pain and a sore throat. Pertinent negatives include no abdominal pain, chest pain, joint pain, nausea, plugged ear sensation or vomiting. Associated symptoms comments: Chills, mild SOA. Treatments tried: antihistamine, dayquil. The treatment provided no relief.        The following portions of the patient's history were reviewed and updated as appropriate: allergies, current medications, past family history, past medical history, past social history, past surgical history and problem list.    Review of Systems   HENT: Positive for congestion, ear pain, rhinorrhea and sore throat.    Respiratory: Positive for cough.    Cardiovascular: Negative for chest pain.   Gastrointestinal: Negative for abdominal pain, nausea and vomiting.   Genitourinary: Negative.    Musculoskeletal: Positive for neck pain. Negative for joint pain.   Neurological: Positive for headache.       Objective   Physical Exam   Constitutional: She is cooperative. No distress.   HENT:   Head: Normocephalic.   Right Ear: Hearing, external ear and ear canal normal. A middle ear effusion is present.   Left Ear: Hearing, external ear and ear canal normal. A middle ear effusion is present.   Nose: Mucosal edema and congestion present. Right sinus exhibits maxillary sinus tenderness and frontal sinus tenderness. Left sinus exhibits maxillary sinus tenderness and frontal sinus tenderness.   Mouth/Throat: Oropharyngeal exudate and posterior oropharyngeal erythema present.   Eyes: Conjunctivae, EOM and lids are normal. Pupils are equal, round, and reactive to light.   Neck: Trachea normal and full passive range of motion without pain.   Cardiovascular: Normal rate, regular rhythm and normal pulses.    Pulmonary/Chest: Effort normal and breath sounds normal.   Lymphadenopathy:     She has cervical adenopathy.        Right cervical: Superficial cervical adenopathy present.        Left cervical: Superficial cervical adenopathy present.   Neurological: She is alert.   Skin: Skin is warm. Capillary refill takes less than 2 seconds.   Psychiatric: She has a normal mood and affect. Her speech is normal and behavior is normal.   Vitals reviewed.        Assessment/Plan   Elizabeth was seen today for uri.    Diagnoses and all orders for this visit:    Acute pansinusitis, recurrence not specified    Other orders  -     azithromycin (ZITHROMAX Z-BERTO) 250 MG tablet; Take 2 tablets the first day, then 1 tablet daily for 4 days.

## 2019-08-10 ENCOUNTER — RESULTS ENCOUNTER (OUTPATIENT)
Dept: FAMILY MEDICINE CLINIC | Facility: CLINIC | Age: 59
End: 2019-08-10

## 2019-08-10 DIAGNOSIS — E78.00 PURE HYPERCHOLESTEROLEMIA: ICD-10-CM

## 2019-08-10 DIAGNOSIS — E03.9 ACQUIRED HYPOTHYROIDISM: ICD-10-CM

## 2019-09-13 DIAGNOSIS — F32.5 MAJOR DEPRESSIVE DISORDER WITH SINGLE EPISODE, IN FULL REMISSION (HCC): ICD-10-CM

## 2019-09-13 DIAGNOSIS — E03.9 ACQUIRED HYPOTHYROIDISM: ICD-10-CM

## 2019-09-13 DIAGNOSIS — E78.00 PURE HYPERCHOLESTEROLEMIA: ICD-10-CM

## 2019-09-16 RX ORDER — LEVOTHYROXINE SODIUM 88 MCG
TABLET ORAL
Qty: 30 TABLET | Refills: 0 | Status: SHIPPED | OUTPATIENT
Start: 2019-09-16 | End: 2019-10-17 | Stop reason: SDUPTHER

## 2019-09-16 RX ORDER — VENLAFAXINE HYDROCHLORIDE 150 MG/1
CAPSULE, EXTENDED RELEASE ORAL
Qty: 30 CAPSULE | Refills: 0 | Status: SHIPPED | OUTPATIENT
Start: 2019-09-16 | End: 2019-10-17 | Stop reason: SDUPTHER

## 2019-09-16 RX ORDER — ROSUVASTATIN CALCIUM 5 MG/1
TABLET, COATED ORAL
Qty: 30 TABLET | Refills: 0 | Status: SHIPPED | OUTPATIENT
Start: 2019-09-16 | End: 2019-10-17 | Stop reason: SDUPTHER

## 2019-10-17 ENCOUNTER — OFFICE VISIT (OUTPATIENT)
Dept: FAMILY MEDICINE CLINIC | Facility: CLINIC | Age: 59
End: 2019-10-17

## 2019-10-17 VITALS
HEIGHT: 66 IN | RESPIRATION RATE: 16 BRPM | SYSTOLIC BLOOD PRESSURE: 114 MMHG | DIASTOLIC BLOOD PRESSURE: 75 MMHG | OXYGEN SATURATION: 98 % | BODY MASS INDEX: 42.27 KG/M2 | WEIGHT: 263 LBS | HEART RATE: 70 BPM

## 2019-10-17 DIAGNOSIS — Z23 NEED FOR IMMUNIZATION AGAINST INFLUENZA: Primary | ICD-10-CM

## 2019-10-17 DIAGNOSIS — F32.5 MAJOR DEPRESSIVE DISORDER WITH SINGLE EPISODE, IN FULL REMISSION (HCC): ICD-10-CM

## 2019-10-17 DIAGNOSIS — E03.9 ACQUIRED HYPOTHYROIDISM: ICD-10-CM

## 2019-10-17 DIAGNOSIS — E66.01 CLASS 3 SEVERE OBESITY DUE TO EXCESS CALORIES WITHOUT SERIOUS COMORBIDITY WITH BODY MASS INDEX (BMI) OF 40.0 TO 44.9 IN ADULT (HCC): ICD-10-CM

## 2019-10-17 DIAGNOSIS — E78.00 PURE HYPERCHOLESTEROLEMIA: Primary | ICD-10-CM

## 2019-10-17 PROBLEM — E66.813 CLASS 3 SEVERE OBESITY DUE TO EXCESS CALORIES WITHOUT SERIOUS COMORBIDITY WITH BODY MASS INDEX (BMI) OF 40.0 TO 44.9 IN ADULT: Status: ACTIVE | Noted: 2019-10-17

## 2019-10-17 PROCEDURE — 99214 OFFICE O/P EST MOD 30 MIN: CPT | Performed by: FAMILY MEDICINE

## 2019-10-17 PROCEDURE — 90471 IMMUNIZATION ADMIN: CPT | Performed by: FAMILY MEDICINE

## 2019-10-17 PROCEDURE — 90674 CCIIV4 VAC NO PRSV 0.5 ML IM: CPT | Performed by: FAMILY MEDICINE

## 2019-10-17 RX ORDER — LEVOTHYROXINE SODIUM 88 MCG
88 TABLET ORAL DAILY
Qty: 30 TABLET | Refills: 5 | Status: SHIPPED | OUTPATIENT
Start: 2019-10-17 | End: 2020-04-23 | Stop reason: SDUPTHER

## 2019-10-17 RX ORDER — VENLAFAXINE HYDROCHLORIDE 150 MG/1
150 CAPSULE, EXTENDED RELEASE ORAL EVERY MORNING
Qty: 30 CAPSULE | Refills: 5 | Status: SHIPPED | OUTPATIENT
Start: 2019-10-17 | End: 2020-04-23 | Stop reason: SDUPTHER

## 2019-10-17 RX ORDER — ROSUVASTATIN CALCIUM 5 MG/1
5 TABLET, COATED ORAL DAILY
Qty: 30 TABLET | Refills: 5 | Status: SHIPPED | OUTPATIENT
Start: 2019-10-17 | End: 2020-04-23 | Stop reason: SDUPTHER

## 2019-10-17 NOTE — ASSESSMENT & PLAN NOTE
Obesity is worsening.  Discussed the patient's BMI.  The BMI is above average; BMI management plan is completed.  General weight loss/lifestyle modification strategies discussed (elicit support from others; identify saboteurs; non-food rewards, etc). Referral to nutritional services.

## 2019-10-17 NOTE — PATIENT INSTRUCTIONS
Advance Directive    Advance directives are legal documents that let you make choices ahead of time about your health care and medical treatment in case you become unable to communicate for yourself. Advance directives are a way for you to communicate your wishes to family, friends, and health care providers. This can help convey your decisions about end-of-life care if you become unable to communicate.  Discussing and writing advance directives should happen over time rather than all at once. Advance directives can be changed depending on your situation and what you want, even after you have signed the advance directives.  If you do not have an advance directive, some states assign family decision makers to act on your behalf based on how closely you are related to them. Each state has its own laws regarding advance directives. You may want to check with your health care provider, , or state representative about the laws in your state. There are different types of advance directives, such as:  · Medical power of .  · Living will.  · Do not resuscitate (DNR) or do not attempt resuscitation (DNAR) order.  Health care proxy and medical power of   A health care proxy, also called a health care agent, is a person who is appointed to make medical decisions for you in cases in which you are unable to make the decisions yourself. Generally, people choose someone they know well and trust to represent their preferences. Make sure to ask this person for an agreement to act as your proxy. A proxy may have to exercise judgment in the event of a medical decision for which your wishes are not known.  A medical power of  is a legal document that names your health care proxy. Depending on the laws in your state, after the document is written, it may also need to be:  · Signed.  · Notarized.  · Dated.  · Copied.  · Witnessed.  · Incorporated into your medical record.  You may also want to appoint  someone to manage your financial affairs in a situation in which you are unable to do so. This is called a durable power of  for finances. It is a separate legal document from the durable power of  for health care. You may choose the same person or someone different from your health care proxy to act as your agent in financial matters.  If you do not appoint a proxy, or if there is a concern that the proxy is not acting in your best interests, a court-appointed guardian may be designated to act on your behalf.  Living will  A living will is a set of instructions documenting your wishes about medical care when you cannot express them yourself. Health care providers should keep a copy of your living will in your medical record. You may want to give a copy to family members or friends. To alert caregivers in case of an emergency, you can place a card in your wallet to let them know that you have a living will and where they can find it. A living will is used if you become:  · Terminally ill.  · Incapacitated.  · Unable to communicate or make decisions.  Items to consider in your living will include:  · The use or non-use of life-sustaining equipment, such as dialysis machines and breathing machines (ventilators).  · A DNR or DNAR order, which is the instruction not to use cardiopulmonary resuscitation (CPR) if breathing or heartbeat stops.  · The use or non-use of tube feeding.  · Withholding of food and fluids.  · Comfort (palliative) care when the goal becomes comfort rather than a cure.  · Organ and tissue donation.  A living will does not give instructions for distributing your money and property if you should pass away. It is recommended that you seek the advice of a  when writing a will. Decisions about taxes, beneficiaries, and asset distribution will be legally binding. This process can relieve your family and friends of any concerns surrounding disputes or questions that may come up about  the distribution of your assets.  DNR or DNAR  A DNR or DNAR order is a request not to have CPR in the event that your heart stops beating or you stop breathing. If a DNR or DNAR order has not been made and shared, a health care provider will try to help any patient whose heart has stopped or who has stopped breathing. If you plan to have surgery, talk with your health care provider about how your DNR or DNAR order will be followed if problems occur.  Summary  · Advance directives are the legal documents that allow you to make choices ahead of time about your health care and medical treatment in case you become unable to communicate for yourself.  · The process of discussing and writing advance directives should happen over time. You can change the advance directives, even after you have signed them.  · Advance directives include DNR or DNAR orders, living [place], and designating an agent as your medical power of .  This information is not intended to replace advice given to you by your health care provider. Make sure you discuss any questions you have with your health care provider.  Document Released: 03/26/2009 Document Revised: 11/06/2017 Document Reviewed: 11/06/2017  [employer] Interactive Patient Education © 2019 [employer]

## 2019-10-17 NOTE — PROGRESS NOTES
"   Subjective       Chief Complaint   Patient presents with   • Hypothyroidism   • Hyperlipidemia         History of Present Illness   Elizabeth Montiel is a 58 y.o. female who presents to the office today to refill medicines.  Labs are due.  Thyroid stable pending lab results.  Depression is being controlled on current therapy.  See PHQ 2 score below.  Cholesterol stable pending lab results.  Patient has had interval weight gain.  She had gastric sleeve surgery in 2016.  Since then she has noticed a gradual increase in weight.  We will have referral to nutrition services.  overall she is feeling well.    I have reviewed and updated her medications, medical history and problem list during today's office visit.     Social History     Tobacco Use   • Smoking status: Never Smoker   • Smokeless tobacco: Never Used   Substance Use Topics   • Alcohol use: Yes     Alcohol/week: 0.6 oz     Types: 1 Cans of beer per week     Comment: 1-2 WEEK       Review of Systems   Constitutional: Negative for fatigue.   Cardiovascular: Negative for chest pain.         Physical Examination:   Objective   /75   Pulse 70   Resp 16   Ht 167.6 cm (66\")   Wt 119 kg (263 lb)   SpO2 98%   BMI 42.45 kg/m²     Body mass index is 42.45 kg/m².    Physical Exam   Constitutional: She is oriented to person, place, and time. She appears well-developed. No distress.   Eyes: Conjunctivae and lids are normal.   Neck: Carotid bruit is not present.   Cardiovascular: Normal rate, regular rhythm and normal heart sounds.   Pulmonary/Chest: Effort normal and breath sounds normal.   Neurological: She is alert and oriented to person, place, and time.   Skin: Skin is warm and dry.   Psychiatric: She has a normal mood and affect. Her behavior is normal.   Vitals reviewed.       Data Reviewed:                      Assessment/Plan:   Assessment/Plan   Diagnoses and all orders for this visit:    1. Pure hypercholesterolemia (Primary)  Assessment & " Plan:  Lipid abnormalities are unchanged.  cont same tx.   Lipids will be reassessed in 6 months.    Orders:  -     rosuvastatin (CRESTOR) 5 MG tablet; Take 1 tablet by mouth Daily for 180 days.  Dispense: 30 tablet; Refill: 5  -     Comprehensive Metabolic Panel; Future  -     CBC & Differential; Future  -     Lipid Panel With / Chol / HDL Ratio; Future  -     CK; Future    2. Major depressive disorder with single episode, in full remission (CMS/formerly [facility])  Assessment & Plan:  Psychological condition is unchanged.  Continue current treatment regimen.  Psychological condition  will be reassessed at the next regular appointment.    Orders:  -     venlafaxine XR (EFFEXOR-XR) 150 MG 24 hr capsule; Take 1 capsule by mouth Every Morning for 180 days.  Dispense: 30 capsule; Refill: 5    3. Acquired hypothyroidism  Assessment & Plan:  The current medical regimen is effective;  continue present plan and medications.      Orders:  -     SYNTHROID 88 MCG tablet; Take 1 tablet by mouth Daily for 180 days.  Dispense: 30 tablet; Refill: 5  -     TSH; Future    4. Class 3 severe obesity due to excess calories without serious comorbidity with body mass index (BMI) of 40.0 to 44.9 in adult (CMS/formerly [facility])  Assessment & Plan:  Obesity is worsening.  Discussed the patient's BMI.  The BMI is above average; BMI management plan is completed.  General weight loss/lifestyle modification strategies discussed (elicit support from others; identify saboteurs; non-food rewards, etc). Referral to nutritional services.     Orders:  -     Ambulatory Referral to Nutrition Services     Follow up:   Return in about 6 months (around 4/17/2020) for Recheck.

## 2019-10-18 LAB
ALBUMIN SERPL-MCNC: 4.5 G/DL (ref 3.5–5.2)
ALBUMIN/GLOB SERPL: 1.5 G/DL
ALP SERPL-CCNC: 101 U/L (ref 39–117)
ALT SERPL-CCNC: 22 U/L (ref 1–33)
AST SERPL-CCNC: 21 U/L (ref 1–32)
BASOPHILS # BLD AUTO: 0.05 10*3/MM3 (ref 0–0.2)
BASOPHILS NFR BLD AUTO: 0.8 % (ref 0–1.5)
BILIRUB SERPL-MCNC: 0.4 MG/DL (ref 0.2–1.2)
BUN SERPL-MCNC: 10 MG/DL (ref 6–20)
BUN/CREAT SERPL: 12.2 (ref 7–25)
CALCIUM SERPL-MCNC: 9.4 MG/DL (ref 8.6–10.5)
CHLORIDE SERPL-SCNC: 105 MMOL/L (ref 98–107)
CHOLEST SERPL-MCNC: 139 MG/DL (ref 0–200)
CHOLEST/HDLC SERPL: 4.21 {RATIO}
CK SERPL-CCNC: 65 U/L (ref 20–180)
CO2 SERPL-SCNC: 26.7 MMOL/L (ref 22–29)
CREAT SERPL-MCNC: 0.82 MG/DL (ref 0.57–1)
EOSINOPHIL # BLD AUTO: 0.19 10*3/MM3 (ref 0–0.4)
EOSINOPHIL NFR BLD AUTO: 3.1 % (ref 0.3–6.2)
ERYTHROCYTE [DISTWIDTH] IN BLOOD BY AUTOMATED COUNT: 14.4 % (ref 12.3–15.4)
GLOBULIN SER CALC-MCNC: 3.1 GM/DL
GLUCOSE SERPL-MCNC: 96 MG/DL (ref 65–99)
HCT VFR BLD AUTO: 39 % (ref 34–46.6)
HDLC SERPL-MCNC: 33 MG/DL (ref 40–60)
HGB BLD-MCNC: 13.1 G/DL (ref 12–15.9)
IMM GRANULOCYTES # BLD AUTO: 0.02 10*3/MM3 (ref 0–0.05)
IMM GRANULOCYTES NFR BLD AUTO: 0.3 % (ref 0–0.5)
LDLC SERPL CALC-MCNC: 77 MG/DL (ref 0–100)
LYMPHOCYTES # BLD AUTO: 2.4 10*3/MM3 (ref 0.7–3.1)
LYMPHOCYTES NFR BLD AUTO: 39 % (ref 19.6–45.3)
MCH RBC QN AUTO: 27.7 PG (ref 26.6–33)
MCHC RBC AUTO-ENTMCNC: 33.6 G/DL (ref 31.5–35.7)
MCV RBC AUTO: 82.5 FL (ref 79–97)
MONOCYTES # BLD AUTO: 0.6 10*3/MM3 (ref 0.1–0.9)
MONOCYTES NFR BLD AUTO: 9.8 % (ref 5–12)
NEUTROPHILS # BLD AUTO: 2.89 10*3/MM3 (ref 1.7–7)
NEUTROPHILS NFR BLD AUTO: 47 % (ref 42.7–76)
NRBC BLD AUTO-RTO: 0 /100 WBC (ref 0–0.2)
PLATELET # BLD AUTO: 251 10*3/MM3 (ref 140–450)
POTASSIUM SERPL-SCNC: 4.5 MMOL/L (ref 3.5–5.2)
PROT SERPL-MCNC: 7.6 G/DL (ref 6–8.5)
RBC # BLD AUTO: 4.73 10*6/MM3 (ref 3.77–5.28)
SODIUM SERPL-SCNC: 143 MMOL/L (ref 136–145)
TRIGL SERPL-MCNC: 147 MG/DL (ref 0–150)
TSH SERPL DL<=0.005 MIU/L-ACNC: 2.52 UIU/ML (ref 0.27–4.2)
VLDLC SERPL CALC-MCNC: 29.4 MG/DL
WBC # BLD AUTO: 6.15 10*3/MM3 (ref 3.4–10.8)

## 2020-01-31 ENCOUNTER — OFFICE VISIT (OUTPATIENT)
Dept: RETAIL CLINIC | Facility: CLINIC | Age: 60
End: 2020-01-31

## 2020-01-31 VITALS
HEART RATE: 83 BPM | RESPIRATION RATE: 18 BRPM | BODY MASS INDEX: 41.21 KG/M2 | HEIGHT: 66 IN | SYSTOLIC BLOOD PRESSURE: 140 MMHG | TEMPERATURE: 98.4 F | DIASTOLIC BLOOD PRESSURE: 89 MMHG | OXYGEN SATURATION: 97 % | WEIGHT: 256.4 LBS

## 2020-01-31 DIAGNOSIS — R51.9 ACUTE NONINTRACTABLE HEADACHE, UNSPECIFIED HEADACHE TYPE: ICD-10-CM

## 2020-01-31 DIAGNOSIS — R09.82 POST-NASAL DRAINAGE: ICD-10-CM

## 2020-01-31 DIAGNOSIS — J01.40 ACUTE NON-RECURRENT PANSINUSITIS: Primary | ICD-10-CM

## 2020-01-31 PROCEDURE — 99213 OFFICE O/P EST LOW 20 MIN: CPT | Performed by: NURSE PRACTITIONER

## 2020-01-31 RX ORDER — AZITHROMYCIN 250 MG/1
TABLET, FILM COATED ORAL
Qty: 6 TABLET | Refills: 0 | Status: SHIPPED | OUTPATIENT
Start: 2020-01-31 | End: 2020-04-23

## 2020-01-31 NOTE — PROGRESS NOTES
"Subjective   Elizabeth Montiel is a 59 y.o. female.     /89 (BP Location: Left arm, Patient Position: Sitting, Cuff Size: Adult)   Pulse 83   Temp 98.4 °F (36.9 °C) (Oral)   Resp 18   Ht 167.6 cm (66\")   Wt 116 kg (256 lb 6.4 oz)   SpO2 97%   BMI 41.38 kg/m²       Sinusitis   This is a new problem. The current episode started 1 to 4 weeks ago. The problem has been gradually worsening since onset. There has been no fever. Her pain is at a severity of 6/10. The pain is moderate. Associated symptoms include headaches and sinus pressure. Past treatments include acetaminophen. The treatment provided no relief.        The following portions of the patient's history were reviewed and updated as appropriate: current medications, past family history, past medical history, past social history, past surgical history and problem list.    Review of Systems   Constitutional: Positive for fatigue.   HENT: Positive for postnasal drip and sinus pressure.    Eyes: Negative.    Respiratory: Negative.    Cardiovascular: Negative.    Gastrointestinal: Negative.    Musculoskeletal: Negative.    Skin: Negative.    Allergic/Immunologic: Positive for environmental allergies.   Neurological: Positive for headache.       Objective   Physical Exam   Constitutional: She is oriented to person, place, and time. She appears well-developed and well-nourished.   HENT:   Head: Normocephalic and atraumatic.   Right Ear: Hearing, tympanic membrane, external ear and ear canal normal.   Left Ear: Hearing, tympanic membrane, external ear and ear canal normal.   Nose: Sinus tenderness and congestion present. Right sinus exhibits maxillary sinus tenderness and frontal sinus tenderness. Left sinus exhibits maxillary sinus tenderness and frontal sinus tenderness.   Mouth/Throat: Uvula is midline, oropharynx is clear and moist and mucous membranes are normal.   Eyes: Pupils are equal, round, and reactive to light. Conjunctivae and EOM are normal. "   Neck: Normal range of motion.   Cardiovascular: Normal rate, regular rhythm and normal heart sounds.   Pulmonary/Chest: Effort normal and breath sounds normal.   Musculoskeletal: Normal range of motion.   Neurological: She is alert and oriented to person, place, and time.   Skin: Skin is warm. Capillary refill takes less than 2 seconds.   Psychiatric: She has a normal mood and affect.   Vitals reviewed.        Assessment/Plan   Elizabeth was seen today for sinusitis.    Diagnoses and all orders for this visit:    Acute non-recurrent pansinusitis  -     azithromycin (ZITHROMAX) 250 MG tablet; Take 2 tablets the first day, then 1 tablet daily for 4 days.    Acute nonintractable headache, unspecified headache type    Post-nasal drainage

## 2020-03-15 ENCOUNTER — RESULTS ENCOUNTER (OUTPATIENT)
Dept: FAMILY MEDICINE CLINIC | Facility: CLINIC | Age: 60
End: 2020-03-15

## 2020-03-15 DIAGNOSIS — E03.9 ACQUIRED HYPOTHYROIDISM: ICD-10-CM

## 2020-03-15 DIAGNOSIS — E78.00 PURE HYPERCHOLESTEROLEMIA: ICD-10-CM

## 2020-04-21 LAB
ALBUMIN SERPL-MCNC: 4.2 G/DL (ref 3.5–5.2)
ALBUMIN/GLOB SERPL: 1.4 G/DL
ALP SERPL-CCNC: 89 U/L (ref 39–117)
ALT SERPL-CCNC: 18 U/L (ref 1–33)
AST SERPL-CCNC: 18 U/L (ref 1–32)
BASOPHILS # BLD AUTO: 0.06 10*3/MM3 (ref 0–0.2)
BASOPHILS NFR BLD AUTO: 0.8 % (ref 0–1.5)
BILIRUB SERPL-MCNC: 0.4 MG/DL (ref 0.2–1.2)
BUN SERPL-MCNC: 11 MG/DL (ref 6–20)
BUN/CREAT SERPL: 12.5 (ref 7–25)
CALCIUM SERPL-MCNC: 9.6 MG/DL (ref 8.6–10.5)
CHLORIDE SERPL-SCNC: 102 MMOL/L (ref 98–107)
CHOLEST SERPL-MCNC: 174 MG/DL (ref 0–200)
CHOLEST/HDLC SERPL: 4.46 {RATIO}
CK SERPL-CCNC: 53 U/L (ref 20–180)
CO2 SERPL-SCNC: 27 MMOL/L (ref 22–29)
CREAT SERPL-MCNC: 0.88 MG/DL (ref 0.57–1)
EOSINOPHIL # BLD AUTO: 0.19 10*3/MM3 (ref 0–0.4)
EOSINOPHIL NFR BLD AUTO: 2.7 % (ref 0.3–6.2)
ERYTHROCYTE [DISTWIDTH] IN BLOOD BY AUTOMATED COUNT: 14.5 % (ref 12.3–15.4)
GLOBULIN SER CALC-MCNC: 3.1 GM/DL
GLUCOSE SERPL-MCNC: 106 MG/DL (ref 65–99)
HCT VFR BLD AUTO: 39.5 % (ref 34–46.6)
HDLC SERPL-MCNC: 39 MG/DL (ref 40–60)
HGB BLD-MCNC: 13.1 G/DL (ref 12–15.9)
IMM GRANULOCYTES # BLD AUTO: 0.02 10*3/MM3 (ref 0–0.05)
IMM GRANULOCYTES NFR BLD AUTO: 0.3 % (ref 0–0.5)
LDLC SERPL CALC-MCNC: 99 MG/DL (ref 0–100)
LYMPHOCYTES # BLD AUTO: 2.94 10*3/MM3 (ref 0.7–3.1)
LYMPHOCYTES NFR BLD AUTO: 41.5 % (ref 19.6–45.3)
MCH RBC QN AUTO: 27.3 PG (ref 26.6–33)
MCHC RBC AUTO-ENTMCNC: 33.2 G/DL (ref 31.5–35.7)
MCV RBC AUTO: 82.3 FL (ref 79–97)
MONOCYTES # BLD AUTO: 0.66 10*3/MM3 (ref 0.1–0.9)
MONOCYTES NFR BLD AUTO: 9.3 % (ref 5–12)
NEUTROPHILS # BLD AUTO: 3.21 10*3/MM3 (ref 1.7–7)
NEUTROPHILS NFR BLD AUTO: 45.4 % (ref 42.7–76)
NRBC BLD AUTO-RTO: 0 /100 WBC (ref 0–0.2)
PLATELET # BLD AUTO: 252 10*3/MM3 (ref 140–450)
POTASSIUM SERPL-SCNC: 4.4 MMOL/L (ref 3.5–5.2)
PROT SERPL-MCNC: 7.3 G/DL (ref 6–8.5)
RBC # BLD AUTO: 4.8 10*6/MM3 (ref 3.77–5.28)
SODIUM SERPL-SCNC: 141 MMOL/L (ref 136–145)
TRIGL SERPL-MCNC: 181 MG/DL (ref 0–150)
TSH SERPL DL<=0.005 MIU/L-ACNC: 4.23 UIU/ML (ref 0.27–4.2)
VLDLC SERPL CALC-MCNC: 36.2 MG/DL (ref 5–40)
WBC # BLD AUTO: 7.08 10*3/MM3 (ref 3.4–10.8)

## 2020-04-23 ENCOUNTER — TELEMEDICINE (OUTPATIENT)
Dept: FAMILY MEDICINE CLINIC | Facility: CLINIC | Age: 60
End: 2020-04-23

## 2020-04-23 DIAGNOSIS — E03.9 ACQUIRED HYPOTHYROIDISM: ICD-10-CM

## 2020-04-23 DIAGNOSIS — E78.00 PURE HYPERCHOLESTEROLEMIA: Primary | ICD-10-CM

## 2020-04-23 DIAGNOSIS — F32.5 MAJOR DEPRESSIVE DISORDER WITH SINGLE EPISODE, IN FULL REMISSION (HCC): ICD-10-CM

## 2020-04-23 PROCEDURE — 99214 OFFICE O/P EST MOD 30 MIN: CPT | Performed by: FAMILY MEDICINE

## 2020-04-23 RX ORDER — LEVOTHYROXINE SODIUM 88 MCG
88 TABLET ORAL DAILY
Qty: 30 TABLET | Refills: 5 | Status: SHIPPED | OUTPATIENT
Start: 2020-04-23 | End: 2020-10-29 | Stop reason: SDUPTHER

## 2020-04-23 RX ORDER — VENLAFAXINE HYDROCHLORIDE 150 MG/1
150 CAPSULE, EXTENDED RELEASE ORAL EVERY MORNING
Qty: 30 CAPSULE | Refills: 5 | Status: SHIPPED | OUTPATIENT
Start: 2020-04-23 | End: 2020-10-29 | Stop reason: SDUPTHER

## 2020-04-23 RX ORDER — ROSUVASTATIN CALCIUM 5 MG/1
5 TABLET, COATED ORAL DAILY
Qty: 30 TABLET | Refills: 5 | Status: SHIPPED | OUTPATIENT
Start: 2020-04-23 | End: 2020-10-29 | Stop reason: SDUPTHER

## 2020-04-23 NOTE — PROGRESS NOTES
Mode of Visit: Video  Elizabeth Montiel confirmed that she was in [place] at the time of this video visit.   The visit included video face to face interaction. No technical issues occurred during this visit.   Patient has given verbal consent for this video visit.  She understands the limitations with limited options for physical exam and wishes to proceed with this visit.   25-39 minutes   Subjective       No chief complaint on file.        HPI:       Elizabeth Montiel is a 59 y.o. female who presents to  [address] [facility] today to refill medicines.  Cholesterol has shown interval worsening.  She has been secluded with the recent Covid outbreak.  Thyroid is largely stable.  She does have some dry skin and brittle hair.  TSH mild elevation above 4.  Depression stable with venlafaxine.  No medication side effects noted.  Discussed diet and exercise while secluded with current pandemic.  Labs reviewed with patient in detail during today's visit.  Blood sugar elevated.  Advised patient to adhere to low carbohydrate diet.  Recheck 6 months.  Stop    I have reviewed and updated her medications, medical history and problem list during today's office visit.     Social History     Tobacco Use   • Smoking status: Never Smoker   • Smokeless tobacco: Never Used   Substance Use Topics   • Alcohol use: Yes     Alcohol/week: 1.0 standard drinks     Types: 1 Cans of beer per week     Comment: 1-2 WEEK       Review of Systems   Skin: Positive for dry skin.        Brittle hair   Psychiatric/Behavioral: Negative for depressed mood.         PE:   Objective   There were no vitals taken for this visit.    There is no height or weight on file to calculate BMI.    Physical Exam   Constitutional: She appears well-developed. No distress.   Eyes: Conjunctivae are normal.   Pulmonary/Chest: Effort normal. She has no wheezes.   Neurological: She is alert. She is not disoriented.    Psychiatric: She has a normal mood and affect. Her speech is normal and behavior is normal.        Data Reviewed:              Lab Results   Component Value Date     (H) 04/21/2020    BUN 11 04/21/2020    CREATININE 0.88 04/21/2020    EGFRIFNONA 66 04/21/2020    EGFRIFAFRI 80 04/21/2020     04/21/2020    K 4.4 04/21/2020     04/21/2020    CALCIUM 9.6 04/21/2020    ALBUMIN 4.20 04/21/2020    BILITOT 0.4 04/21/2020    ALKPHOS 89 04/21/2020    AST 18 04/21/2020    ALT 18 04/21/2020    CHLPL 174 04/21/2020    TRIG 181 (H) 04/21/2020    HDL 39 (L) 04/21/2020    VLDL 36.2 04/21/2020    LDL 99 04/21/2020    CKTOTAL 53 04/21/2020    WBC 7.08 04/21/2020    RBC 4.80 04/21/2020    HCT 39.5 04/21/2020    MCV 82.3 04/21/2020    MCH 27.3 04/21/2020    TSH 4.230 (H) 04/21/2020          A/P:     Assessment/Plan   Diagnoses and all orders for this visit:    1. Pure hypercholesterolemia (Primary)  Assessment & Plan:  Lipid abnormalities are worsening.  Nutritional counseling was provided. and Pharmacotherapy as ordered.  Lipids will be reassessed in 6 months.    Orders:  -     rosuvastatin (CRESTOR) 5 MG tablet; Take 1 tablet by mouth Daily for 180 days.  Dispense: 30 tablet; Refill: 5  -     Comprehensive Metabolic Panel; Future  -     CBC & Differential; Future  -     Lipid Panel With / Chol / HDL Ratio; Future  -     CK; Future    2. Acquired hypothyroidism  Assessment & Plan:  Mild worsening noted.  Will recheck in 6 months with labs.  No medication change for now.    Orders:  -     SYNTHROID 88 MCG tablet; Take 1 tablet by mouth Daily for 180 days.  Dispense: 30 tablet; Refill: 5  -     TSH+Free T4; Future    3. Major depressive disorder with single episode, in full remission (CMS/HCC)  Assessment & Plan:  Psychological condition is unchanged.  Continue current treatment regimen.  Psychological condition  will be reassessed at the next regular appointment.    Orders:  -     venlafaxine XR (EFFEXOR-XR) 150 MG  24 hr capsule; Take 1 capsule by mouth Every Morning for 180 days.  Dispense: 30 capsule; Refill: 5  -     Comprehensive Metabolic Panel; Future  -     CBC & Differential; Future        Follow up:    Return in about 6 months (around 10/23/2020) for Recheck/Medication  Refill.

## 2020-04-23 NOTE — ASSESSMENT & PLAN NOTE
Lipid abnormalities are worsening.  Nutritional counseling was provided. and Pharmacotherapy as ordered.  Lipids will be reassessed in 6 months.

## 2020-05-08 ENCOUNTER — TELEMEDICINE (OUTPATIENT)
Dept: FAMILY MEDICINE CLINIC | Facility: CLINIC | Age: 60
End: 2020-05-08

## 2020-05-08 DIAGNOSIS — J22 ACUTE RESPIRATORY INFECTION: Primary | ICD-10-CM

## 2020-05-08 PROCEDURE — 99213 OFFICE O/P EST LOW 20 MIN: CPT | Performed by: FAMILY MEDICINE

## 2020-05-08 RX ORDER — AZITHROMYCIN 250 MG/1
TABLET, FILM COATED ORAL
Qty: 6 TABLET | Refills: 0 | Status: SHIPPED | OUTPATIENT
Start: 2020-05-08 | End: 2020-10-29

## 2020-05-08 NOTE — PROGRESS NOTES
Subjective   Elizabeth Montiel is a 59 y.o. female.     History of Present Illness     59-year-old female who is a new patient to me presents today via video visit for cough and headache x10 days.    Patient reports for about the past 10 days she has had headache and cough and fatigue.  Cough is dry; sometimes has coughing fits.  Headache is around her forehead constant waxes and wanes in severity.  Not associated with any vision changes nausea or vomiting.  She feels short of breath on light exertion when she goes from room to room.  She is not short of breath at rest.  She denies fevers temperature has been around 98.4-98.9.  She does report intermittent chills.  She has been taking Tylenol as needed which is not making too much of a difference.  Reports some decreased appetite.  She went through drive-through testing for COVID-19 at [facility] about 1 week ago and is still waiting for results.  No sick contacts at home.    The following portions of the patient's history were reviewed and updated as appropriate: allergies, current medications, past family history, past medical history, past social history, past surgical history and problem list.    Review of Systems   Constitutional: Positive for chills and fatigue. Negative for diaphoresis and fever.   HENT: Negative for congestion.    Respiratory: Positive for cough and shortness of breath (on exertion).    Cardiovascular: Negative for chest pain, palpitations and leg swelling.   Gastrointestinal: Negative for abdominal pain, diarrhea and vomiting.   Neurological: Positive for headache.       Objective   Physical Exam   Constitutional: She is oriented to person, place, and time. She appears well-developed and well-nourished. No distress.   HENT:   Head: Normocephalic and atraumatic.   Pulmonary/Chest: Effort normal. No respiratory distress.   Neurological: She is alert and oriented to person, place, and time.   Psychiatric: She has a normal mood and affect. Her  behavior is normal.               Assessment/Plan     Diagnoses and all orders for this visit:    Acute respiratory infection  -     azithromycin (Zithromax Z-Shin) 250 MG tablet; Take 2 tablets the first day, then 1 tablet daily for 4 days.  -     QUESTIONNAIRE SERIES      No acute distress currently.  Will prescribe azithromycin as noted above.  Advised patient to check her temperature every 8 hours.  Monitor for shortness of breath; any worsening she is to go to ER.  Advised to take Tylenol as needed for fever or pain.  Avoid NSAIDs.  Strict ER precautions given.  Patient expressed understanding and agreeable to plan.    Time spent on visit: 20 minutes.

## 2020-05-08 NOTE — PATIENT INSTRUCTIONS
Take azithromycin as prescribed.    Check her temperature every 8 hours.  If you have a fever which is not controlled with Tylenol go to ER.    Any shortness of breath at rest or worsening of shortness of breath on exertion go to ER.    Nurse should be calling you daily for the next 2 weeks to check on your symptoms.  Self quarantine for the next 2 weeks.    Avoid NSAIDs like ibuprofen/Advil if possible.    Call us or go to ER for any new or worsening symptoms.      Follow-up with me in 1 week or sooner if needed.    If you get your results for COVID-19 testing before then please call us and let us know.    See attached references in your my chart for additional information/guidance.

## 2020-09-20 ENCOUNTER — RESULTS ENCOUNTER (OUTPATIENT)
Dept: FAMILY MEDICINE CLINIC | Facility: CLINIC | Age: 60
End: 2020-09-20

## 2020-09-20 DIAGNOSIS — E78.00 PURE HYPERCHOLESTEROLEMIA: ICD-10-CM

## 2020-09-20 DIAGNOSIS — F32.5 MAJOR DEPRESSIVE DISORDER WITH SINGLE EPISODE, IN FULL REMISSION (HCC): ICD-10-CM

## 2020-09-20 DIAGNOSIS — E03.9 ACQUIRED HYPOTHYROIDISM: ICD-10-CM

## 2020-09-30 ENCOUNTER — APPOINTMENT (OUTPATIENT)
Dept: GENERAL RADIOLOGY | Facility: HOSPITAL | Age: 60
End: 2020-09-30

## 2020-09-30 ENCOUNTER — HOSPITAL ENCOUNTER (EMERGENCY)
Facility: HOSPITAL | Age: 60
Discharge: HOME | End: 2020-09-30
Attending: EMERGENCY MEDICINE | Admitting: EMERGENCY MEDICINE

## 2020-09-30 VITALS
DIASTOLIC BLOOD PRESSURE: 61 MMHG | WEIGHT: 265 LBS | HEIGHT: 61 IN | RESPIRATION RATE: 16 BRPM | SYSTOLIC BLOOD PRESSURE: 117 MMHG | BODY MASS INDEX: 50.03 KG/M2 | HEART RATE: 72 BPM | OXYGEN SATURATION: 96 % | TEMPERATURE: 98.4 F
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
## 2020-09-30 DIAGNOSIS — S70.02XA CONTUSION OF LEFT HIP, INITIAL ENCOUNTER: Primary | ICD-10-CM

## 2020-09-30 PROCEDURE — 73502 X-RAY EXAM HIP UNI 2-3 VIEWS: CPT

## 2020-09-30 PROCEDURE — 99283 EMERGENCY DEPT VISIT LOW MDM: CPT

## 2020-09-30 RX ORDER — NAPROXEN 500 MG/1
500 TABLET ORAL 2 TIMES DAILY PRN
Qty: 20 TABLET | Refills: 0 | Status: SHIPPED | OUTPATIENT
Start: 2020-09-30 | End: 2020-10-29

## 2020-10-29 ENCOUNTER — OFFICE VISIT (OUTPATIENT)
Dept: FAMILY MEDICINE CLINIC | Facility: CLINIC | Age: 60
End: 2020-10-29

## 2020-10-29 VITALS
WEIGHT: 273 LBS | TEMPERATURE: 97.3 F | HEART RATE: 79 BPM | RESPIRATION RATE: 16 BRPM | BODY MASS INDEX: 51.54 KG/M2 | SYSTOLIC BLOOD PRESSURE: 130 MMHG | DIASTOLIC BLOOD PRESSURE: 81 MMHG | HEIGHT: 61 IN | OXYGEN SATURATION: 95 %

## 2020-10-29 DIAGNOSIS — F32.5 MAJOR DEPRESSIVE DISORDER WITH SINGLE EPISODE, IN FULL REMISSION (HCC): ICD-10-CM

## 2020-10-29 DIAGNOSIS — E78.00 PURE HYPERCHOLESTEROLEMIA: ICD-10-CM

## 2020-10-29 DIAGNOSIS — E03.9 ACQUIRED HYPOTHYROIDISM: ICD-10-CM

## 2020-10-29 PROBLEM — Z86.16 HISTORY OF 2019 NOVEL CORONAVIRUS DISEASE (COVID-19): Status: ACTIVE | Noted: 2020-10-29

## 2020-10-29 PROBLEM — Z86.16 HISTORY OF 2019 NOVEL CORONAVIRUS DISEASE (COVID-19): Status: ACTIVE | Noted: 2020-05-01

## 2020-10-29 PROCEDURE — 99214 OFFICE O/P EST MOD 30 MIN: CPT | Performed by: FAMILY MEDICINE

## 2020-10-29 RX ORDER — VENLAFAXINE HYDROCHLORIDE 150 MG/1
150 CAPSULE, EXTENDED RELEASE ORAL EVERY MORNING
Qty: 30 CAPSULE | Refills: 5 | Status: SHIPPED | OUTPATIENT
Start: 2020-10-29 | End: 2021-03-08 | Stop reason: SDUPTHER

## 2020-10-29 RX ORDER — ROSUVASTATIN CALCIUM 5 MG/1
5 TABLET, COATED ORAL DAILY
Qty: 30 TABLET | Refills: 5 | Status: SHIPPED | OUTPATIENT
Start: 2020-10-29 | End: 2021-03-22

## 2020-10-29 RX ORDER — LEVOTHYROXINE SODIUM 88 MCG
88 TABLET ORAL DAILY
Qty: 30 TABLET | Refills: 5 | Status: SHIPPED | OUTPATIENT
Start: 2020-10-29 | End: 2021-03-22

## 2020-10-29 NOTE — PROGRESS NOTES
"   Subjective       Chief Complaint   Patient presents with   • Hyperlipidemia     med refilll    • Depression         SUBJECTIVE:       This patient presents for medication refill.  Labs are due.  Lipids stable pending lab results.  Thyroid stable pending lab results.  Depression controlled on current therapy.  Overall she feels well.  History of Present Illness       Review of Systems   Constitutional: Positive for fatigue.   HENT: Negative.       I have reviewed the ROS as documented above. David Giordano MD              OBJECTIVE   Objective   /81   Pulse 79   Temp 97.3 °F (36.3 °C) (Tympanic)   Resp 16   Ht 154.9 cm (61\")   Wt 124 kg (273 lb)   SpO2 95%   BMI 51.58 kg/m²  Body mass index is 51.58 kg/m².    Physical Exam  Vitals signs reviewed.   Constitutional:       General: She is not in acute distress.  Eyes:      General: Lids are normal.      Conjunctiva/sclera: Conjunctivae normal.   Neck:      Thyroid: No thyroid mass or thyromegaly.      Vascular: No carotid bruit.      Trachea: Trachea and phonation normal. No tracheal deviation.   Cardiovascular:      Rate and Rhythm: Normal rate and regular rhythm.      Heart sounds: Normal heart sounds. No murmur.   Pulmonary:      Effort: Pulmonary effort is normal.      Breath sounds: Normal breath sounds.   Skin:     General: Skin is warm and dry.   Neurological:      Mental Status: She is alert. She is not disoriented.   Psychiatric:         Speech: Speech normal.         Behavior: Behavior normal. Behavior is cooperative.                        Procedures           ASSESSMENT/PLAN:     Assessment/Plan   Diagnoses and all orders for this visit:    1. Pure hypercholesterolemia  Assessment & Plan:  Lipid abnormalities are unchanged.  Pharmacotherapy as ordered.  Lipids will be reassessed in 6 months.    Orders:  -     rosuvastatin (CRESTOR) 5 MG tablet; Take 1 tablet by mouth Daily for 180 days.  Dispense: 30 tablet; Refill: 5    2. Acquired " hypothyroidism  Assessment & Plan:  The current medical regimen is effective;  continue present plan and medications.        Orders:  -     Synthroid 88 MCG tablet; Take 1 tablet by mouth Daily for 180 days.  Dispense: 30 tablet; Refill: 5    3. Major depressive disorder with single episode, in full remission (CMS/[facility])  Assessment & Plan:  Psychological condition is unchanged.  Continue current treatment regimen.  Psychological condition  will be reassessed at the next regular appointment.    Orders:  -     venlafaxine XR (EFFEXOR-XR) 150 MG 24 hr capsule; Take 1 capsule by mouth Every Morning for 180 days.  Dispense: 30 capsule; Refill: 5        FOLLOW UP:    Return in about 6 months (around 4/29/2021).

## 2021-03-08 ENCOUNTER — TELEPHONE (OUTPATIENT)
Dept: FAMILY MEDICINE CLINIC | Facility: CLINIC | Age: 61
End: 2021-03-08

## 2021-03-08 DIAGNOSIS — F32.5 MAJOR DEPRESSIVE DISORDER WITH SINGLE EPISODE, IN FULL REMISSION (HCC): ICD-10-CM

## 2021-03-08 RX ORDER — VENLAFAXINE HYDROCHLORIDE 150 MG/1
150 CAPSULE, EXTENDED RELEASE ORAL EVERY MORNING
Qty: 30 CAPSULE | Refills: 1 | Status: SHIPPED | OUTPATIENT
Start: 2021-03-08 | End: 2021-03-24 | Stop reason: SDUPTHER

## 2021-03-08 NOTE — TELEPHONE ENCOUNTER
Caller: Elizabeth Montiel    Relationship: Self    Best call back number: [phone]    Medication needed:   Requested Prescriptions     Pending Prescriptions Disp Refills   • venlafaxine XR (EFFEXOR-XR) 150 MG 24 hr capsule 30 capsule 5     Sig: Take 1 capsule by mouth Every Morning for 180 days.       When do you need the refill by: asap    What details did the patient provide when requesting the medication:    Does the patient have less than a 3 day supply:  [x] Yes  [] No    What is the patient's preferred pharmacy:      [facility] [address] [address] - [phone]  - [phone] FX

## 2021-03-22 DIAGNOSIS — E03.9 ACQUIRED HYPOTHYROIDISM: ICD-10-CM

## 2021-03-22 DIAGNOSIS — E78.00 PURE HYPERCHOLESTEROLEMIA: ICD-10-CM

## 2021-03-22 RX ORDER — LEVOTHYROXINE SODIUM 88 UG/1
TABLET ORAL
Qty: 30 TABLET | Refills: 0 | Status: SHIPPED | OUTPATIENT
Start: 2021-03-22 | End: 2021-03-24 | Stop reason: SDUPTHER

## 2021-03-22 RX ORDER — ROSUVASTATIN CALCIUM 5 MG/1
TABLET, COATED ORAL
Qty: 30 TABLET | Refills: 0 | Status: SHIPPED | OUTPATIENT
Start: 2021-03-22 | End: 2021-03-24 | Stop reason: SDUPTHER

## 2021-03-24 ENCOUNTER — BULK ORDERING (OUTPATIENT)
Dept: CASE MANAGEMENT | Facility: OTHER | Age: 61
End: 2021-03-24

## 2021-03-24 ENCOUNTER — TELEPHONE (OUTPATIENT)
Dept: FAMILY MEDICINE CLINIC | Facility: CLINIC | Age: 61
End: 2021-03-24

## 2021-03-24 DIAGNOSIS — E78.00 PURE HYPERCHOLESTEROLEMIA: ICD-10-CM

## 2021-03-24 DIAGNOSIS — F32.5 MAJOR DEPRESSIVE DISORDER WITH SINGLE EPISODE, IN FULL REMISSION (HCC): ICD-10-CM

## 2021-03-24 DIAGNOSIS — E03.9 ACQUIRED HYPOTHYROIDISM: ICD-10-CM

## 2021-03-24 DIAGNOSIS — Z23 IMMUNIZATION DUE: ICD-10-CM

## 2021-03-24 RX ORDER — LEVOTHYROXINE SODIUM 88 UG/1
88 TABLET ORAL DAILY
Qty: 30 TABLET | Refills: 0 | Status: SHIPPED | OUTPATIENT
Start: 2021-03-24 | End: 2021-04-12 | Stop reason: SDUPTHER

## 2021-03-24 RX ORDER — ROSUVASTATIN CALCIUM 5 MG/1
5 TABLET, COATED ORAL DAILY
Qty: 30 TABLET | Refills: 0 | Status: CANCELLED | OUTPATIENT
Start: 2021-03-24

## 2021-03-24 RX ORDER — ROSUVASTATIN CALCIUM 5 MG/1
5 TABLET, COATED ORAL DAILY
Qty: 30 TABLET | Refills: 0 | Status: SHIPPED | OUTPATIENT
Start: 2021-03-24 | End: 2021-04-12 | Stop reason: SDUPTHER

## 2021-03-24 RX ORDER — VENLAFAXINE HYDROCHLORIDE 150 MG/1
150 CAPSULE, EXTENDED RELEASE ORAL EVERY MORNING
Qty: 30 CAPSULE | Refills: 0 | Status: SHIPPED | OUTPATIENT
Start: 2021-03-24 | End: 2021-04-28 | Stop reason: SDUPTHER

## 2021-03-24 RX ORDER — LEVOTHYROXINE SODIUM 88 UG/1
88 TABLET ORAL DAILY
Qty: 30 TABLET | Refills: 0 | Status: CANCELLED | OUTPATIENT
Start: 2021-03-24

## 2021-04-12 ENCOUNTER — TELEPHONE (OUTPATIENT)
Dept: FAMILY MEDICINE CLINIC | Facility: CLINIC | Age: 61
End: 2021-04-12

## 2021-04-12 DIAGNOSIS — E78.00 PURE HYPERCHOLESTEROLEMIA: ICD-10-CM

## 2021-04-12 DIAGNOSIS — E03.9 ACQUIRED HYPOTHYROIDISM: ICD-10-CM

## 2021-04-12 NOTE — TELEPHONE ENCOUNTER
Caller: Elizabeth Montiel    Relationship: Self    Best call back number: [phone]    Medication needed:   Requested Prescriptions     Pending Prescriptions Disp Refills   • levothyroxine (SYNTHROID, LEVOTHROID) 88 MCG tablet 30 tablet 0     Sig: Take 1 tablet by mouth Daily.   • rosuvastatin (CRESTOR) 5 MG tablet 30 tablet 0     Sig: Take 1 tablet by mouth Daily for 30 days.       When do you need the refill by: ASAP    What additional details did the patient provide when requesting the medication: PATIENT IS COMPLETELY OUT     Does the patient have less than a 3 day supply:  [x] Yes  [] No    What is the patient's preferred pharmacy: [id number] [place] [facility] [address] AT [facility] [phone] [facility] [phone] FX

## 2021-04-14 RX ORDER — ROSUVASTATIN CALCIUM 5 MG/1
5 TABLET, COATED ORAL DAILY
Qty: 30 TABLET | Refills: 0 | Status: SHIPPED | OUTPATIENT
Start: 2021-04-14 | End: 2021-04-28 | Stop reason: SDUPTHER

## 2021-04-14 RX ORDER — LEVOTHYROXINE SODIUM 88 UG/1
88 TABLET ORAL DAILY
Qty: 30 TABLET | Refills: 0 | Status: SHIPPED | OUTPATIENT
Start: 2021-04-14 | End: 2021-04-28 | Stop reason: SDUPTHER

## 2021-04-28 ENCOUNTER — OFFICE VISIT (OUTPATIENT)
Dept: FAMILY MEDICINE CLINIC | Facility: CLINIC | Age: 61
End: 2021-04-28

## 2021-04-28 VITALS
HEART RATE: 106 BPM | DIASTOLIC BLOOD PRESSURE: 62 MMHG | OXYGEN SATURATION: 94 % | WEIGHT: 274 LBS | TEMPERATURE: 98 F | SYSTOLIC BLOOD PRESSURE: 130 MMHG | BODY MASS INDEX: 51.73 KG/M2 | RESPIRATION RATE: 16 BRPM | HEIGHT: 61 IN

## 2021-04-28 DIAGNOSIS — F32.5 MAJOR DEPRESSIVE DISORDER WITH SINGLE EPISODE, IN FULL REMISSION (HCC): ICD-10-CM

## 2021-04-28 DIAGNOSIS — E78.00 PURE HYPERCHOLESTEROLEMIA: Primary | ICD-10-CM

## 2021-04-28 DIAGNOSIS — L29.9 ITCHING OF EAR: ICD-10-CM

## 2021-04-28 DIAGNOSIS — E66.01 CLASS 3 SEVERE OBESITY DUE TO EXCESS CALORIES WITHOUT SERIOUS COMORBIDITY WITH BODY MASS INDEX (BMI) OF 50.0 TO 59.9 IN ADULT (HCC): ICD-10-CM

## 2021-04-28 DIAGNOSIS — E03.9 ACQUIRED HYPOTHYROIDISM: ICD-10-CM

## 2021-04-28 PROCEDURE — 99214 OFFICE O/P EST MOD 30 MIN: CPT | Performed by: FAMILY MEDICINE

## 2021-04-28 RX ORDER — FEXOFENADINE HCL 180 MG/1
180 TABLET ORAL DAILY
Qty: 90 TABLET | Refills: 1
Start: 2021-04-28 | End: 2021-11-11 | Stop reason: SDUPTHER

## 2021-04-28 RX ORDER — ROSUVASTATIN CALCIUM 5 MG/1
5 TABLET, COATED ORAL DAILY
Qty: 90 TABLET | Refills: 1 | Status: SHIPPED | OUTPATIENT
Start: 2021-04-28 | End: 2021-10-28

## 2021-04-28 RX ORDER — VENLAFAXINE HYDROCHLORIDE 150 MG/1
150 CAPSULE, EXTENDED RELEASE ORAL EVERY MORNING
Qty: 90 CAPSULE | Refills: 1 | Status: SHIPPED | OUTPATIENT
Start: 2021-04-28 | End: 2021-10-08

## 2021-04-28 RX ORDER — LEVOTHYROXINE SODIUM 88 UG/1
88 TABLET ORAL DAILY
Qty: 90 TABLET | Refills: 1 | Status: SHIPPED | OUTPATIENT
Start: 2021-04-28 | End: 2021-10-28

## 2021-04-28 NOTE — ASSESSMENT & PLAN NOTE
Patient's (Body mass index is 51.77 kg/m².) indicates that they are morbidly obese (BMI > 40 or > 35 with obesity - related health condition) with obesity-related health conditions that include hypertension and dyslipidemias . Obesity is worsening. BMI is is above average; BMI management plan is completed. We discussed portion control, increasing exercise and Weight Watchers or other Commercial based weight reduction program.

## 2021-04-28 NOTE — PROGRESS NOTES
"Chief Complaint  Hyperlipidemia (6 month follow up/ med refill)    Subjective      History of Present Illness      Elizabeth Montiel presents to [facility] to refill medications and review recent lab results. No medication side effects are reported. Overall the patient is feeling well. Interval weight gain noted.     Review of Systems   HENT:        Left ear itching for several weeks             Objective     Vital Signs:   /62   Pulse 106   Temp 98 °F (36.7 °C) (Tympanic)   Resp 16   Ht 154.9 cm (61\")   Wt 124 kg (274 lb)   SpO2 94%   BMI 51.77 kg/m²       Physical Exam  Vitals reviewed.   Constitutional:       General: She is not in acute distress.     Appearance: She is obese.   HENT:      Right Ear: Tympanic membrane and ear canal normal.      Left Ear: Tympanic membrane and ear canal normal.   Eyes:      General: Lids are normal.      Conjunctiva/sclera: Conjunctivae normal.   Neck:      Vascular: No carotid bruit.      Trachea: No tracheal deviation.   Cardiovascular:      Rate and Rhythm: Normal rate and regular rhythm.      Heart sounds: Normal heart sounds. No murmur heard.     Pulmonary:      Effort: Pulmonary effort is normal.      Breath sounds: Normal breath sounds.   Skin:     General: Skin is warm and dry.   Neurological:      Mental Status: She is alert. She is not disoriented.   Psychiatric:         Speech: Speech normal.         Behavior: Behavior normal. Behavior is cooperative.              Result Review :                            Assessment/Plan     Assessment and Plan      Diagnoses and all orders for this visit:    1. Pure hypercholesterolemia (Primary)  Assessment & Plan:  Lipid abnormalities are unchanged.  Pharmacotherapy as ordered.  Lipids will be reassessed in 6 months.    Orders:  -     rosuvastatin (CRESTOR) 5 MG tablet; Take 1 tablet by mouth Daily for 180 days.  Dispense: 90 tablet; Refill: 1  -     Comprehensive Metabolic Panel; Future  -  "    CBC & Differential; Future  -     Lipid Panel With / Chol / HDL Ratio; Future  -     CK; Future    2. Acquired hypothyroidism  -     levothyroxine (SYNTHROID, LEVOTHROID) 88 MCG tablet; Take 1 tablet by mouth Daily for 180 days.  Dispense: 90 tablet; Refill: 1  -     TSH; Future    3. Major depressive disorder with single episode, in full remission (CMS/[facility])  Assessment & Plan:  Patient's depression is recurrent and is mild without psychosis. Their depression is currently in full remission and the condition is unchanged. This will be reassessed at the next regular appointment. F/U as described:patient will continue current medication therapy.    Orders:  -     venlafaxine XR (EFFEXOR-XR) 150 MG 24 hr capsule; Take 1 capsule by mouth Every Morning for 180 days.  Dispense: 90 capsule; Refill: 1    4. Class 3 severe obesity due to excess calories without serious comorbidity with body mass index (BMI) of 50.0 to 59.9 in adult (CMS/[facility])  Assessment & Plan:  Patient's (Body mass index is 51.77 kg/m².) indicates that they are morbidly obese (BMI > 40 or > 35 with obesity - related health condition) with obesity-related health conditions that include hypertension and dyslipidemias . Obesity is worsening. BMI is is above average; BMI management plan is completed. We discussed portion control, increasing exercise and Weight Watchers or other Commercial based weight reduction program.       5. Itching of ear  -     fexofenadine (Allegra Allergy) 180 MG tablet; Take 1 tablet by mouth Daily for 180 days.  Dispense: 90 tablet; Refill: 1          Follow Up   Return in about 6 months (around 10/28/2021) for recheck/refill medication.    Patient was given instructions and counseling regarding her condition or for health maintenance advice. Please see specific information pulled into the AVS if appropriate.

## 2021-05-27 LAB
ALBUMIN SERPL-MCNC: 4.4 G/DL (ref 3.5–5.2)
ALBUMIN/GLOB SERPL: 1.6 G/DL
ALP SERPL-CCNC: 98 U/L (ref 39–117)
ALT SERPL-CCNC: 14 U/L (ref 1–33)
AST SERPL-CCNC: 16 U/L (ref 1–32)
BASOPHILS # BLD AUTO: 0.05 10*3/MM3 (ref 0–0.2)
BASOPHILS NFR BLD AUTO: 0.8 % (ref 0–1.5)
BILIRUB SERPL-MCNC: 0.4 MG/DL (ref 0–1.2)
BUN SERPL-MCNC: 16 MG/DL (ref 6–20)
BUN/CREAT SERPL: 22.2 (ref 7–25)
CALCIUM SERPL-MCNC: 9.8 MG/DL (ref 8.6–10.5)
CHLORIDE SERPL-SCNC: 109 MMOL/L (ref 98–107)
CHOLEST SERPL-MCNC: 152 MG/DL (ref 0–200)
CHOLEST/HDLC SERPL: 4.22 {RATIO}
CK SERPL-CCNC: 64 U/L (ref 20–180)
CO2 SERPL-SCNC: 24.4 MMOL/L (ref 22–29)
CREAT SERPL-MCNC: 0.72 MG/DL (ref 0.57–1)
EOSINOPHIL # BLD AUTO: 0.16 10*3/MM3 (ref 0–0.4)
EOSINOPHIL NFR BLD AUTO: 2.6 % (ref 0.3–6.2)
ERYTHROCYTE [DISTWIDTH] IN BLOOD BY AUTOMATED COUNT: 14.5 % (ref 12.3–15.4)
GLOBULIN SER CALC-MCNC: 2.8 GM/DL
GLUCOSE SERPL-MCNC: 108 MG/DL (ref 65–99)
HCT VFR BLD AUTO: 39.6 % (ref 34–46.6)
HDLC SERPL-MCNC: 36 MG/DL (ref 40–60)
HGB BLD-MCNC: 13.3 G/DL (ref 12–15.9)
IMM GRANULOCYTES # BLD AUTO: 0.03 10*3/MM3 (ref 0–0.05)
IMM GRANULOCYTES NFR BLD AUTO: 0.5 % (ref 0–0.5)
LDLC SERPL CALC-MCNC: 90 MG/DL (ref 0–100)
LYMPHOCYTES # BLD AUTO: 2.35 10*3/MM3 (ref 0.7–3.1)
LYMPHOCYTES NFR BLD AUTO: 38.3 % (ref 19.6–45.3)
MCH RBC QN AUTO: 28.1 PG (ref 26.6–33)
MCHC RBC AUTO-ENTMCNC: 33.6 G/DL (ref 31.5–35.7)
MCV RBC AUTO: 83.5 FL (ref 79–97)
MONOCYTES # BLD AUTO: 0.51 10*3/MM3 (ref 0.1–0.9)
MONOCYTES NFR BLD AUTO: 8.3 % (ref 5–12)
NEUTROPHILS # BLD AUTO: 3.03 10*3/MM3 (ref 1.7–7)
NEUTROPHILS NFR BLD AUTO: 49.5 % (ref 42.7–76)
NRBC BLD AUTO-RTO: 0.2 /100 WBC (ref 0–0.2)
PLATELET # BLD AUTO: 278 10*3/MM3 (ref 140–450)
POTASSIUM SERPL-SCNC: 4.8 MMOL/L (ref 3.5–5.2)
PROT SERPL-MCNC: 7.2 G/DL (ref 6–8.5)
RBC # BLD AUTO: 4.74 10*6/MM3 (ref 3.77–5.28)
SODIUM SERPL-SCNC: 144 MMOL/L (ref 136–145)
T4 FREE SERPL-MCNC: 1.02 NG/DL (ref 0.93–1.7)
TRIGL SERPL-MCNC: 145 MG/DL (ref 0–150)
TSH SERPL DL<=0.005 MIU/L-ACNC: 4.79 UIU/ML (ref 0.27–4.2)
VLDLC SERPL CALC-MCNC: 26 MG/DL (ref 5–40)
WBC # BLD AUTO: 6.13 10*3/MM3 (ref 3.4–10.8)

## 2021-10-08 DIAGNOSIS — F32.5 MAJOR DEPRESSIVE DISORDER WITH SINGLE EPISODE, IN FULL REMISSION (HCC): ICD-10-CM

## 2021-10-11 RX ORDER — VENLAFAXINE HYDROCHLORIDE 150 MG/1
CAPSULE, EXTENDED RELEASE ORAL
Qty: 30 CAPSULE | Refills: 0 | Status: SHIPPED | OUTPATIENT
Start: 2021-10-11 | End: 2021-11-11 | Stop reason: SDUPTHER

## 2021-10-28 DIAGNOSIS — E78.00 PURE HYPERCHOLESTEROLEMIA: ICD-10-CM

## 2021-10-28 DIAGNOSIS — E03.9 ACQUIRED HYPOTHYROIDISM: ICD-10-CM

## 2021-10-28 RX ORDER — ROSUVASTATIN CALCIUM 5 MG/1
TABLET, COATED ORAL
Qty: 30 TABLET | Refills: 0 | Status: SHIPPED | OUTPATIENT
Start: 2021-10-28 | End: 2021-11-11 | Stop reason: SDUPTHER

## 2021-10-28 RX ORDER — LEVOTHYROXINE SODIUM 88 UG/1
TABLET ORAL
Qty: 30 TABLET | Refills: 0 | Status: SHIPPED | OUTPATIENT
Start: 2021-10-28 | End: 2021-11-11 | Stop reason: SDUPTHER

## 2021-11-11 ENCOUNTER — OFFICE VISIT (OUTPATIENT)
Dept: FAMILY MEDICINE CLINIC | Facility: CLINIC | Age: 61
End: 2021-11-11

## 2021-11-11 VITALS
WEIGHT: 272 LBS | RESPIRATION RATE: 18 BRPM | TEMPERATURE: 97.5 F | BODY MASS INDEX: 43.71 KG/M2 | HEIGHT: 66 IN | SYSTOLIC BLOOD PRESSURE: 138 MMHG | OXYGEN SATURATION: 97 % | HEART RATE: 83 BPM | DIASTOLIC BLOOD PRESSURE: 81 MMHG

## 2021-11-11 DIAGNOSIS — E03.9 ACQUIRED HYPOTHYROIDISM: ICD-10-CM

## 2021-11-11 DIAGNOSIS — E78.00 PURE HYPERCHOLESTEROLEMIA: Primary | ICD-10-CM

## 2021-11-11 DIAGNOSIS — F32.5 MAJOR DEPRESSIVE DISORDER WITH SINGLE EPISODE, IN FULL REMISSION (HCC): ICD-10-CM

## 2021-11-11 DIAGNOSIS — R21 RASH: ICD-10-CM

## 2021-11-11 DIAGNOSIS — L29.9 ITCHING OF EAR: ICD-10-CM

## 2021-11-11 DIAGNOSIS — M25.561 ARTHRALGIA OF RIGHT KNEE: ICD-10-CM

## 2021-11-11 PROCEDURE — 99214 OFFICE O/P EST MOD 30 MIN: CPT | Performed by: FAMILY MEDICINE

## 2021-11-11 RX ORDER — ACETAMINOPHEN 500 MG
1000 TABLET ORAL 3 TIMES DAILY PRN
Qty: 180 TABLET | Refills: 11
Start: 2021-11-11 | End: 2021-12-19

## 2021-11-11 RX ORDER — ROSUVASTATIN CALCIUM 5 MG/1
5 TABLET, COATED ORAL DAILY
Qty: 90 TABLET | Refills: 1 | Status: SHIPPED | OUTPATIENT
Start: 2021-11-11

## 2021-11-11 RX ORDER — LEVOTHYROXINE SODIUM 0.1 MG/1
100 TABLET ORAL DAILY
Qty: 90 TABLET | Refills: 1 | Status: SHIPPED | OUTPATIENT
Start: 2021-11-11 | End: 2022-05-10

## 2021-11-11 RX ORDER — VENLAFAXINE HYDROCHLORIDE 150 MG/1
150 CAPSULE, EXTENDED RELEASE ORAL EVERY MORNING
Qty: 90 CAPSULE | Refills: 1 | Status: SHIPPED | OUTPATIENT
Start: 2021-11-11

## 2021-11-11 RX ORDER — FEXOFENADINE HCL 180 MG/1
180 TABLET ORAL DAILY
Qty: 90 TABLET | Refills: 1
Start: 2021-11-11 | End: 2022-05-10

## 2021-11-11 NOTE — PROGRESS NOTES
"Chief Complaint  Hyperlipidemia (6 month follow up )    Subjective          Bee presents to [facility] to review labs and refill medicines.  Thyroid not to goal.  She does have fatigue.  Cholesterol is largely unchanged.  Mild elevation of triglycerides noted.  Depression controlled.  She has had some trouble with sleeping.  She complains of right knee pain.  She has a whitish colored rash on the right leg that has been there for quite a while and is unchanging.  She complains of itching in the ears and some scalp itching periodically.  Occasionally she does have some skin changes in the scalp.  Today her scalp is clear.          Objective   Vital Signs:   Vitals:    11/11/21 1555   BP: 138/81   Pulse: 83   Resp: 18   Temp: 97.5 °F (36.4 °C)   TempSrc: Skin   SpO2: 97%   Weight: 123 kg (272 lb)   Height: 167.6 cm (66\")        Physical Exam  Vitals reviewed.   Constitutional:       General: She is not in acute distress.  Eyes:      General: Lids are normal.      Conjunctiva/sclera: Conjunctivae normal.   Neck:      Vascular: No carotid bruit.      Trachea: No tracheal deviation.   Cardiovascular:      Rate and Rhythm: Normal rate and regular rhythm.      Heart sounds: Normal heart sounds. No murmur heard.      Pulmonary:      Effort: Pulmonary effort is normal.      Breath sounds: Normal breath sounds.   Skin:     General: Skin is warm and dry.      Comments: See photo below. No scalp or facial rash today   Neurological:      Mental Status: She is alert. She is not disoriented.   Psychiatric:         Speech: Speech normal.         Behavior: Behavior normal. Behavior is cooperative.        RLE rash      Result Review :                Assessment and Plan    Diagnoses and all orders for this visit:    1. Pure hypercholesterolemia (Primary)  Assessment & Plan:  Lipid abnormalities are unchanged.  Pharmacotherapy as ordered.  Lipids will be reassessed in 6 months.    Orders:  -     " rosuvastatin (CRESTOR) 5 MG tablet; Take 1 tablet by mouth Daily.  Dispense: 90 tablet; Refill: 1    2. Itching of ear  Assessment & Plan:  Continue same.     Orders:  -     fexofenadine (Allegra Allergy) 180 MG tablet; Take 1 tablet by mouth Daily for 180 days.  Dispense: 90 tablet; Refill: 1  -     Ambulatory Referral to Dermatology    3. Acquired hypothyroidism  Assessment & Plan:  tsh  Not controlled. Increase levothyroxine to 100 mcg daily. Recheck in 3 months.     Orders:  -     levothyroxine (SYNTHROID, LEVOTHROID) 100 MCG tablet; Take 1 tablet by mouth Daily for 180 days.  Dispense: 90 tablet; Refill: 1  -     TSH+Free T4; Future  -     US Thyroid    4. Major depressive disorder with single episode, in full remission ([facility])  Assessment & Plan:  Patient's depression is single episode and is mild without psychosis. Their depression is currently in full remission and the condition is unchanged. This will be reassessed at the next regular appointment. F/U as described:patient will continue current medication therapy.    Orders:  -     venlafaxine XR (EFFEXOR-XR) 150 MG 24 hr capsule; Take 1 capsule by mouth Every Morning.  Dispense: 90 capsule; Refill: 1    5. Arthralgia of right knee  Assessment & Plan:  New problem. Referral to dermatology to help rule out psoriasis as cause.     Orders:  -     acetaminophen (TYLENOL) 500 MG tablet; Take 2 tablets by mouth 3 (Three) Times a Day As Needed for Mild Pain  or Moderate Pain . Do not exceed 3000 mg daily  Dispense: 180 tablet; Refill: 11  -     Ambulatory Referral to Dermatology    6. Rash  Assessment & Plan:  Refer to dermatology    Orders:  -     Ambulatory Referral to Dermatology      Follow Up   Return in about 3 months (around 2/11/2022) for recheck/refill medication.  Patient was given instructions and counseling regarding her condition or for health maintenance advice. Please see specific information pulled into the AVS if appropriate.

## 2021-12-13 ENCOUNTER — HOSPITAL ENCOUNTER (OUTPATIENT)
Dept: ULTRASOUND IMAGING | Facility: HOSPITAL | Age: 61
Discharge: HOME | End: 2021-12-13
Admitting: FAMILY MEDICINE

## 2021-12-13 PROCEDURE — 76536 US EXAM OF HEAD AND NECK: CPT

## 2022-05-23 ENCOUNTER — TELEPHONE (OUTPATIENT)
Dept: GASTROENTEROLOGY | Facility: CLINIC | Age: 62
End: 2022-05-23

## 2022-05-23 NOTE — TELEPHONE ENCOUNTER
Received referral through fax- scanned into media.  Please contact patient to complete questionnaire.

## 2022-06-13 ENCOUNTER — PRE-PROCEDURE SCREENING (OUTPATIENT)
Dept: GASTROENTEROLOGY | Facility: CLINIC | Age: 62
End: 2022-06-13

## 2023-05-03 ENCOUNTER — OFFICE VISIT (OUTPATIENT)
Dept: FAMILY MEDICINE CLINIC | Facility: CLINIC | Age: 63
End: 2023-05-03
Payer: COMMERCIAL

## 2023-05-03 VITALS
BODY MASS INDEX: 31.5 KG/M2 | WEIGHT: 196 LBS | TEMPERATURE: 97.5 F | DIASTOLIC BLOOD PRESSURE: 73 MMHG | SYSTOLIC BLOOD PRESSURE: 117 MMHG | RESPIRATION RATE: 16 BRPM | HEIGHT: 66 IN | HEART RATE: 66 BPM

## 2023-05-03 DIAGNOSIS — S29.012A STRAIN OF RHOMBOID MUSCLE, INITIAL ENCOUNTER: Primary | ICD-10-CM

## 2023-05-03 RX ORDER — DICLOFENAC SODIUM 75 MG/1
75 TABLET, DELAYED RELEASE ORAL 2 TIMES DAILY
Qty: 60 TABLET | Refills: 2 | Status: SHIPPED | OUTPATIENT
Start: 2023-05-03

## 2023-05-03 RX ORDER — METHYLPREDNISOLONE 4 MG/1
TABLET ORAL
Qty: 21 TABLET | Refills: 0 | Status: SHIPPED | OUTPATIENT
Start: 2023-05-03

## 2023-05-03 RX ORDER — SEMAGLUTIDE 1.34 MG/ML
INJECTION, SOLUTION SUBCUTANEOUS
COMMUNITY
Start: 2022-12-02

## 2023-05-03 RX ORDER — BACLOFEN 20 MG/1
20 TABLET ORAL NIGHTLY
Qty: 30 TABLET | Refills: 2 | Status: SHIPPED | OUTPATIENT
Start: 2023-05-03

## 2023-05-03 NOTE — PROGRESS NOTES
"Subjective   Elizabeth Montiel is a 62 y.o. female.     CC: Shoulder Pain    History of Present Illness     Patient of Dr. Giordano's comes to see me today complaining of pain of the shoulder for the last 3 months after a fall while on vacation in [place].  Patient fell forward after tripping and landed mainly on the right side of her face and then both arms caught her weight as she fell down.  She reports no shoulder pain at that time but rather that started the following day.  At this point she has been taking ibuprofen and using topicals to relieve some of the discomfort and her range of motion is reported as full, however the pain remains.        The following portions of the patient's history were reviewed and updated as appropriate: allergies, current medications, past family history, past medical history, past social history, past surgical history and problem list.    Review of Systems   Constitutional: Negative for activity change, chills and fever.   Respiratory: Negative for cough.    Cardiovascular: Negative for chest pain.   Psychiatric/Behavioral: Negative for dysphoric mood.       /73   Pulse 66   Temp 97.5 °F (36.4 °C) (Oral)   Resp 16   Ht 167.6 cm (66\")   Wt 88.9 kg (196 lb)   BMI 31.64 kg/m²     Objective   Physical Exam  Constitutional:       General: She is not in acute distress.     Appearance: She is well-developed.   Pulmonary:      Effort: Pulmonary effort is normal.   Musculoskeletal:         General: Tenderness (over left Rhomboids as well as Tricep region) present. Normal range of motion.   Neurological:      Mental Status: She is alert and oriented to person, place, and time.   Psychiatric:         Behavior: Behavior normal.         Thought Content: Thought content normal.         Assessment & Plan   Diagnoses and all orders for this visit:    1. Strain of rhomboid muscle, initial encounter (Primary)  -     diclofenac (VOLTAREN) 75 MG EC tablet; Take 1 tablet by mouth 2 (Two) " Times a Day.  Dispense: 60 tablet; Refill: 2  -     baclofen (LIORESAL) 20 MG tablet; Take 1 tablet by mouth Every Night.  Dispense: 30 tablet; Refill: 2  -     Ambulatory Referral to Physical Therapy Evaluate and treat  -     methylPREDNISolone (Medrol) 4 MG dose pack; follow package directions  Dispense: 21 tablet; Refill: 0    Heat and stretching discussed.

## 2023-06-01 ENCOUNTER — OFFICE VISIT (OUTPATIENT)
Dept: FAMILY MEDICINE CLINIC | Facility: CLINIC | Age: 63
End: 2023-06-01

## 2023-06-01 VITALS
TEMPERATURE: 97.6 F | HEART RATE: 84 BPM | SYSTOLIC BLOOD PRESSURE: 102 MMHG | OXYGEN SATURATION: 98 % | RESPIRATION RATE: 18 BRPM | DIASTOLIC BLOOD PRESSURE: 66 MMHG | HEIGHT: 66 IN | BODY MASS INDEX: 31.98 KG/M2 | WEIGHT: 199 LBS

## 2023-06-01 DIAGNOSIS — F32.5 MAJOR DEPRESSIVE DISORDER WITH SINGLE EPISODE, IN FULL REMISSION: ICD-10-CM

## 2023-06-01 DIAGNOSIS — E78.00 PURE HYPERCHOLESTEROLEMIA: ICD-10-CM

## 2023-06-01 PROBLEM — R06.83 SNORES: Status: ACTIVE | Noted: 2022-05-12

## 2023-06-01 PROBLEM — R06.83 SNORES: Status: RESOLVED | Noted: 2022-05-12 | Resolved: 2023-06-01

## 2023-06-01 PROBLEM — R21 RASH: Status: RESOLVED | Noted: 2021-11-11 | Resolved: 2023-06-01

## 2023-06-01 PROBLEM — L29.9 ITCHING OF EAR: Status: RESOLVED | Noted: 2021-11-11 | Resolved: 2023-06-01

## 2023-06-01 PROBLEM — E66.09 CLASS 1 OBESITY DUE TO EXCESS CALORIES WITH SERIOUS COMORBIDITY AND BODY MASS INDEX (BMI) OF 32.0 TO 32.9 IN ADULT: Status: ACTIVE | Noted: 2019-10-17

## 2023-06-01 PROBLEM — E11.65 CONTROLLED TYPE 2 DIABETES MELLITUS WITH HYPERGLYCEMIA: Status: ACTIVE | Noted: 2022-08-22

## 2023-06-01 PROBLEM — E66.811 CLASS 1 OBESITY DUE TO EXCESS CALORIES WITH SERIOUS COMORBIDITY AND BODY MASS INDEX (BMI) OF 32.0 TO 32.9 IN ADULT: Status: ACTIVE | Noted: 2019-10-17

## 2023-06-01 PROBLEM — L40.9 PSORIASIS: Status: ACTIVE | Noted: 2022-05-12

## 2023-06-01 PROBLEM — E07.9 THYROID DISEASE: Status: RESOLVED | Noted: 2022-05-12 | Resolved: 2023-06-01

## 2023-06-01 PROBLEM — E55.9 VITAMIN D DEFICIENCY: Status: ACTIVE | Noted: 2022-10-04

## 2023-06-01 PROBLEM — E07.9 THYROID DISEASE: Status: ACTIVE | Noted: 2022-05-12

## 2023-06-01 PROBLEM — M25.561 ARTHRALGIA OF RIGHT KNEE: Status: RESOLVED | Noted: 2021-11-11 | Resolved: 2023-06-01

## 2023-06-01 PROCEDURE — 99214 OFFICE O/P EST MOD 30 MIN: CPT | Performed by: FAMILY MEDICINE

## 2023-06-01 RX ORDER — VENLAFAXINE HYDROCHLORIDE 150 MG/1
150 CAPSULE, EXTENDED RELEASE ORAL EVERY MORNING
Qty: 90 CAPSULE | Refills: 2 | Status: SHIPPED | OUTPATIENT
Start: 2023-06-01 | End: 2024-02-26

## 2023-06-01 RX ORDER — LEVOTHYROXINE SODIUM 0.07 MG/1
75 TABLET ORAL
COMMUNITY

## 2023-06-01 RX ORDER — FEXOFENADINE HCL 180 MG/1
180 TABLET ORAL DAILY
COMMUNITY

## 2023-06-01 RX ORDER — ROSUVASTATIN CALCIUM 10 MG/1
10 TABLET, COATED ORAL NIGHTLY
Qty: 90 TABLET | Refills: 2 | Status: SHIPPED | OUTPATIENT
Start: 2023-06-01 | End: 2024-02-26

## 2023-06-01 NOTE — PROGRESS NOTES
"Chief Complaint  Hyperlipidemia (Med check )    Subjective        HPI   Bee presents to [facility] for recent lab review and medication refill.  Her lipids are not quite to goal with LDL cholesterol at or above 90 historically and most recently.  Her goal LDL cholesterol is less than 70.  Depression well controlled with venlafaxine.  No medication side effects reported.  Recent labs have shown good control thyroid.  That medication is now being monitored and prescribed by her endocrinologist.          Objective   Vital Signs:   Vitals:    06/01/23 0808   BP: 102/66   Pulse: 84   Resp: 18   Temp: 97.6 °F (36.4 °C)   SpO2: 98%   Weight: 90.3 kg (199 lb)   Height: 167.6 cm (66\")        BMI is >= 30 and <35. (Class 1 Obesity). The following options were offered after discussion;: weight loss educational material (shared in after visit summary), exercise counseling/recommendations and nutrition counseling/recommendations        Physical Exam  Vitals reviewed.   Constitutional:       General: She is not in acute distress.  Cardiovascular:      Rate and Rhythm: Normal rate and regular rhythm.      Heart sounds: Normal heart sounds.   Pulmonary:      Effort: Pulmonary effort is normal.      Breath sounds: Normal breath sounds.   Neurological:      General: No focal deficit present.      Mental Status: She is alert.   Psychiatric:         Attention and Perception: Attention normal.         Mood and Affect: Mood normal.         Behavior: Behavior normal.          Result Review :     The following data was reviewed by: David Giordano MD on 06/01/2023:  LIPID PANEL (04/05/2023 08:13) -LDL 90    COMPREHENSIVE METABOLIC PANEL (04/05/2023 08:13)       Assessment and Plan    Diagnoses and all orders for this visit:    1. Pure hypercholesterolemia  Assessment & Plan:  Lipid abnormalities are unchanged.  Nutritional counseling was provided. and Pharmacotherapy as ordered.  Increase rosuvastatin 10 mg " bedtime dosing.  Lipids will be reassessed in 6 months.    Orders:  -     rosuvastatin (CRESTOR) 10 MG tablet; Take 1 tablet by mouth Every Night for 270 days.  Dispense: 90 tablet; Refill: 2    2. Major depressive disorder with single episode, in full remission  Assessment & Plan:  Condition is stable. The current medical regimen is effective;  continue present plan and medications.    Orders:  -     venlafaxine XR (EFFEXOR-XR) 150 MG 24 hr capsule; Take 1 capsule by mouth Every Morning for 270 days.  Dispense: 90 capsule; Refill: 2      Follow Up   Return in about 6 months (around 12/1/2023) for recheck/refill medication.  Patient was given instructions and counseling regarding her condition or for health maintenance advice. Please see specific information pulled into the AVS if appropriate.

## 2023-06-01 NOTE — ASSESSMENT & PLAN NOTE
Lipid abnormalities are unchanged.  Nutritional counseling was provided. and Pharmacotherapy as ordered.  Increase rosuvastatin 10 mg bedtime dosing.  Lipids will be reassessed in 6 months.

## 2023-07-24 DIAGNOSIS — S29.012A STRAIN OF RHOMBOID MUSCLE, INITIAL ENCOUNTER: ICD-10-CM

## 2023-07-24 RX ORDER — BACLOFEN 20 MG/1
20 TABLET ORAL NIGHTLY
Qty: 90 TABLET | OUTPATIENT
Start: 2023-07-24

## 2023-07-28 DIAGNOSIS — S29.012A STRAIN OF RHOMBOID MUSCLE, INITIAL ENCOUNTER: ICD-10-CM

## 2023-07-31 RX ORDER — DICLOFENAC SODIUM 75 MG/1
TABLET, DELAYED RELEASE ORAL
Qty: 180 TABLET | OUTPATIENT
Start: 2023-07-31

## 2023-09-20 ENCOUNTER — HOSPITAL ENCOUNTER (EMERGENCY)
Facility: HOSPITAL | Age: 63
Discharge: HOME | End: 2023-09-20
Attending: EMERGENCY MEDICINE
Payer: COMMERCIAL

## 2023-09-20 ENCOUNTER — APPOINTMENT (OUTPATIENT)
Dept: CT IMAGING | Facility: HOSPITAL | Age: 63
End: 2023-09-20
Payer: COMMERCIAL

## 2023-09-20 ENCOUNTER — APPOINTMENT (OUTPATIENT)
Dept: GENERAL RADIOLOGY | Facility: HOSPITAL | Age: 63
End: 2023-09-20
Payer: COMMERCIAL

## 2023-09-20 VITALS
BODY MASS INDEX: 31.08 KG/M2 | HEIGHT: 67 IN | DIASTOLIC BLOOD PRESSURE: 80 MMHG | SYSTOLIC BLOOD PRESSURE: 152 MMHG | RESPIRATION RATE: 16 BRPM | TEMPERATURE: 98.1 F | HEART RATE: 87 BPM | WEIGHT: 198 LBS | OXYGEN SATURATION: 97 %

## 2023-09-20 DIAGNOSIS — S29.012A STRAIN OF RHOMBOID MUSCLE, INITIAL ENCOUNTER: ICD-10-CM

## 2023-09-20 DIAGNOSIS — M48.02 CERVICAL STENOSIS OF SPINE: Primary | ICD-10-CM

## 2023-09-20 PROCEDURE — 73060 X-RAY EXAM OF HUMERUS: CPT

## 2023-09-20 PROCEDURE — 99284 EMERGENCY DEPT VISIT MOD MDM: CPT

## 2023-09-20 PROCEDURE — 72125 CT NECK SPINE W/O DYE: CPT

## 2023-09-20 RX ORDER — METHOCARBAMOL 750 MG/1
750 TABLET, FILM COATED ORAL 3 TIMES DAILY PRN
Qty: 30 TABLET | Refills: 0 | Status: SHIPPED | OUTPATIENT
Start: 2023-09-20

## 2023-09-20 RX ORDER — METHOCARBAMOL 500 MG/1
1000 TABLET, FILM COATED ORAL ONCE
Status: COMPLETED | OUTPATIENT
Start: 2023-09-20 | End: 2023-09-20

## 2023-09-20 RX ORDER — HYDROCODONE BITARTRATE AND ACETAMINOPHEN 5; 325 MG/1; MG/1
1 TABLET ORAL ONCE AS NEEDED
Status: DISCONTINUED | OUTPATIENT
Start: 2023-09-20 | End: 2023-09-20

## 2023-09-20 RX ORDER — HYDROCODONE BITARTRATE AND ACETAMINOPHEN 5; 325 MG/1; MG/1
1 TABLET ORAL ONCE
Status: COMPLETED | OUTPATIENT
Start: 2023-09-20 | End: 2023-09-20

## 2023-09-20 RX ORDER — HYDROCODONE BITARTRATE AND ACETAMINOPHEN 5; 325 MG/1; MG/1
1 TABLET ORAL EVERY 6 HOURS PRN
Qty: 12 TABLET | Refills: 0 | Status: SHIPPED | OUTPATIENT
Start: 2023-09-20

## 2023-09-20 RX ADMIN — METHOCARBAMOL TABLETS 1000 MG: 500 TABLET, COATED ORAL at 18:36

## 2023-09-20 RX ADMIN — HYDROCODONE BITARTRATE AND ACETAMINOPHEN 1 TABLET: 5; 325 TABLET ORAL at 18:57

## 2023-09-20 NOTE — FSED PROVIDER NOTE
Subjective   History of Present Illness  Patient is a 62-year-old female presents the emergency room for sudden pain she had in her upper right arm and neck.  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  She had a computer sitting on her lap and she simply reached to touch some of the keys and felt the pain shoot through.  She still having a pain radiating into the right arm.  Says is very painful.  She has movement but it is painful as well.  There was no direct injury.    Review of Systems   Constitutional: Negative.    HENT: Negative.     Eyes: Negative.    Respiratory: Negative.     Cardiovascular: Negative.    Gastrointestinal: Negative.    Endocrine: Negative.    Musculoskeletal:  Positive for neck pain. Negative for back pain.   Skin:  Negative for color change and wound.   Neurological: Negative.    Psychiatric/Behavioral: Negative.     All other systems reviewed and are negative.    Past Medical History:   Diagnosis Date    Allergic     Arthralgia of right knee 11/11/2021    Arthritis     Borderline hyperlipidemia 05/03/2016    Chronic fatigue 05/03/2016    Colon polyp     Depression     Diabetes mellitus 06/01/2024    Eating disorder     Fatigue     Hemorrhoids     History of fatty infiltration of liver 05/03/2016    History of PCOS     Hyperlipidemia     Hypothyroidism     Itching of ear 11/11/2021    Liver disease     Low back pain     Lyme disease     Multiple joint pain     Obesity, Class III, BMI 40-49.9 (morbid obesity) 05/03/2016    LUANNE (obstructive sleep apnea)     compliant with Bipap machine    Pneumonia     Prediabetes     RLS (restless legs syndrome)        Allergies   Allergen Reactions    Penicillins Anaphylaxis    Doxycycline GI Intolerance    Codeine Rash    Tricor [Fenofibrate] Rash       Past Surgical History:   Procedure Laterality Date    ABDOMINAL SURGERY      FOR BENIGN TUMOR REMOVAL.    APPENDECTOMY      BARIATRIC SURGERY  2016    S     COLONOSCOPY      DIAGNOSTIC LAPAROSCOPY      benign tumor removal    GASTRIC SLEEVE LAPAROSCOPIC N/A 7/11/2016    Procedure: GASTRIC SLEEVE LAPAROSCOPIC WITH HIATAL HERNIA;  Surgeon: Enrique Hannon Jr., MD;  [place] [facility] [place] [facility]  Service:     HEMORRHOIDECTOMY      HYSTERECTOMY      OOPHORECTOMY      TUBAL ABDOMINAL LIGATION         Family History   Adopted: Yes   Problem Relation Age of Onset    Cancer Maternal Grandmother     Birth defects Son         T 13    Cancer Paternal Grandmother         Breast    Cancer Father         Lung       Social History     Socioeconomic History    Marital status:     Number of children: 3   Tobacco Use    Smoking status: Never    Smokeless tobacco: Never   Vaping Use    Vaping Use: Never used   Substance and Sexual Activity    Alcohol use: Yes     Alcohol/week: 1.0 standard drink     Types: 1 Drinks containing 0.5 oz of alcohol per week     Comment: 1-2 WEEK    Drug use: No    Sexual activity: Yes     Partners: Male     Birth control/protection: Post-menopausal           Objective   Physical Exam  Constitutional:       Appearance: Normal appearance.   HENT:      Head: Normocephalic.   Eyes:      Conjunctiva/sclera: Conjunctivae normal.   Cardiovascular:      Rate and Rhythm: Normal rate and regular rhythm.      Pulses: Normal pulses.   Pulmonary:      Effort: Pulmonary effort is normal.      Breath sounds: Normal breath sounds.   Musculoskeletal:         General: Normal range of motion.      Cervical back: Normal range of motion and neck supple. Tenderness present.      Comments: Painful to put her right arm behind her back, strong    Skin:     Capillary Refill: Capillary refill takes less than 2 seconds.   Neurological:      Mental Status: She is alert.      Gait: Gait normal.   Psychiatric:         Mood and Affect: Mood normal.         Behavior: Behavior normal.       Procedures           ED Course                                           Medical Decision  Making  Patient has pain that starts at the superior aspect of the right trapezius and the paraspinal muscles.  Also on the biceps.  She has neurovascular intact and is able to fully move her arm although it hurts to put her arm behind her back.  She has no problem with abduction or abduction.  I feel this is more nerve pain and ordered CT of her neck which shows stenosis at C5-6 and C6-7.  She has not had any injury.  I also ordered an x-ray of the humerus to make sure there was not some sort of pathological fracture because a lot of her pain is in her bicep.  It is unremarkable.  I spoke with her about following up with spine specialist.  I am getting give her hydrocodone prescription which is [place] appropriate.  I will also give her Robaxin as it is helped her today.  Declined a sling.  I will for her to spine.  Strict return precautions especially if she is losing the strength of her  or has any worsening symptoms.    Amount and/or Complexity of Data Reviewed  Radiology: ordered.    Risk  Prescription drug management.        Final diagnoses:   Cervical stenosis of spine       ED Disposition  ED Disposition       ED Disposition   Discharge    Condition   Stable    Comment   --               Quentin Faria MD  [address] [address]  [phone]               Medication List        New Prescriptions      HYDROcodone-acetaminophen 5-325 MG per tablet  Commonly known as: NORCO  Take 1 tablet by mouth Every 6 (Six) Hours As Needed for Moderate Pain.     methocarbamol 750 MG tablet  Commonly known as: ROBAXIN  Take 1 tablet by mouth 3 (Three) Times a Day As Needed for Muscle Spasms.               Where to Get Your Medications        These medications were sent to [facility]/pharmacy #[id number] - [facility] [place], [place] - [address] AT [facility] [phone] [facility] [phone]   [address], [facility] [address]      Phone: [phone]    HYDROcodone-acetaminophen 5-325 MG per tablet  methocarbamol 750 MG tablet

## 2023-09-21 RX ORDER — BACLOFEN 20 MG/1
20 TABLET ORAL NIGHTLY
Qty: 90 TABLET | OUTPATIENT
Start: 2023-09-21

## 2023-09-21 RX ORDER — DICLOFENAC SODIUM 75 MG/1
TABLET, DELAYED RELEASE ORAL
Qty: 180 TABLET | OUTPATIENT
Start: 2023-09-21

## 2023-10-11 NOTE — PROGRESS NOTES
"Subjective   History of Present Illness: Elizabeth Montiel is a 62 y.o. female is being seen for consultation today at the request of Henry Rios MD for neck pain that radiates into bilateral arms that around December 2022 without event.  She says it started mainly has neck stiffness and then progressed to neck pain with some left arm pain initially and then now she has more right arm pain and left arm pain.  She also has a pain that radiates to the subocciput as well.  The right arm pain really began over the last couple of months and at times she will get a very strong burning sensation in the proximal right arm that comes and goes. She reports ringing in her right ear. She denies any recent conservative treatment other than oral steroids and high-dose ibuprofen.  She denies Lhermitte sign.    Neck Pain   The current episode started more than 1 year ago. The problem has been gradually worsening. The pain is present in the left side and right side. The quality of the pain is described as aching. The symptoms are aggravated by position. Associated symptoms include numbness, tingling and weakness.       Tobacco Use: Low Risk  (10/13/2023)    Patient History     Smoking Tobacco Use: Never     Smokeless Tobacco Use: Never     Passive Exposure: Not on file        The following portions of the patient's history were reviewed and updated as appropriate: allergies, current medications, past family history, past medical history, past social history, past surgical history and problem list.    Review of Systems   Musculoskeletal:  Positive for neck pain.   Neurological:  Positive for tingling, weakness and numbness.       Objective     Vitals:    10/13/23 1351   BP: 132/78   Weight: 89.8 kg (198 lb)   Height: 170.2 cm (67\")     Body mass index is 31.01 kg/mý.      Physical Exam  Neurologic Exam    Physical Exam:    CONSTITUTIONAL: This 62 year old  female appears well developed, well-nourished and in no acute " distress.    HEAD & FACE: the head and face are symmetric, normocephalic and atraumatic.    EYES: Inspection of the conjunctivae and lids reveals no swelling, erythema or discharge.  Pupils are round, equal and reactive to light and there is no scleral icterus.    EARS, NOSE, MOUTH & THROAT: On inspection, the ears and nose are within normal limits.    NECK: the neck is supple and symmetric. The trachea is midline with no masses.  Range of motion of the neck is markedly limited as she only has about 30 degrees of flexion, 10 degrees of extension, 20 degrees of lateral bending bilaterally and about 30 degrees of rotation bilaterally.  With neck motion she gets a significant amount of neck pain.    PULMONARY: Respiratory effort is normal with no increased work of breathing or signs of respiratory distress.    CARDIOVASCULAR: Pedal pulses are +2/4 bilaterally. Examination of the extremities shows no edema or varicosities.    MUSCULOSKELETAL: Gait and station are within normal limits. The spine has normal alignment and range of motion.    SKIN: The skin is warm, dry and intact    NEUROLOGIC:   Cranial Nerves 2-12 intact  Normal motor strength noted although she does guard the right upper extremity to confrontational testing. Muscle bulk and tone are normal.  Sensory exam is normal to fine touch to confrontational testing bilaterally  Reflexes on the right side demonstrates 2/4 Triceps Reflex, 2/4 Biceps Reflex, 2/4 Brachioradialis Reflex, 2/4 Knee Jerk Reflex, 1/4 Ankle Jerk Reflex and no ankle clonus on the right.   Reflexes on the left side demonstrates 2/4 Triceps Reflex, 2/4 Biceps Reflex, 2/4 Brachioradialis Reflex, 2/4 Knee Jerk Reflex, 1/4 Ankle Jerk Reflex and no ankle clonus on the left.  Superficial/Primitive Reflexes: primitive reflexes were absent.  Chawla's, Babinski, and Clonus signs all negative.  No coordination deficit observed.  Radicular testing showed a negative Spurling's maneuver  Cortical  function is intact and without deficits. Speech is normal.    PSYCHIATRIC: oriented to person, place and time. Patient's mood and affect are normal.    Assessment & Plan   Independent Review of Radiographic Studies:      I personally reviewed the images from the following studies.    The CT scan of the cervical spine done on September 20, 2023 at [facility] reveals mild anterolisthesis of C4 on C5.  There is facet and uncovertebral joint hypertrophy throughout the cervical spine most notable at C5-C6 and C6-C7.  The degree of stenosis and neuroforaminal narrowing is difficult to assess without intrathecal contrast administration and therefore the radiologist suggested MRI should be considered if that is a concern.    Medical Decision Making:      Patient has an unusual presentation of neck pain, suboccipital pain and pain radiating into 1 or both arms at any given time.  Given her pattern of presentation over the last 10 months the CAT scan raises a few concerns but does not tell us anything specific with regard to her symptomatology.  I think before recommending treatment we should get an MRI of the cervical spine as well as flexion-extension cervical films since she was identified to have anterolisthesis of C4 on C5.  Her symptomatology in the right upper arm and radiating to both shoulders could reflect significant stenosis or neural impingement at the C4-5 level.  I will see her back upon completion of the dynamic cervical films and the MRI of the cervical spine.    Return for review of completed images.    Diagnoses and all orders for this visit:    1. Neck pain (Primary)  -     MRI Cervical Spine Without Contrast; Future  -     XR Spine Lumbar Complete With Flex & Ext; Future    2. Bilateral arm pain  -     MRI Cervical Spine Without Contrast; Future  -     XR Spine Lumbar Complete With Flex & Ext; Future    3. Acquired spondylolisthesis of cervical vertebra  -     MRI Cervical Spine Without Contrast;  Future  -     XR Spine Lumbar Complete With Flex & Ext; Future    4. Cervical spondylosis without myelopathy  -     MRI Cervical Spine Without Contrast; Future  -     XR Spine Lumbar Complete With Flex & Ext; Future             Quentin Faria MD FACS FAANS  Neurological Surgery

## 2023-10-13 ENCOUNTER — OFFICE VISIT (OUTPATIENT)
Dept: NEUROSURGERY | Facility: CLINIC | Age: 63
End: 2023-10-13
Payer: COMMERCIAL

## 2023-10-13 VITALS
SYSTOLIC BLOOD PRESSURE: 132 MMHG | HEIGHT: 67 IN | WEIGHT: 198 LBS | BODY MASS INDEX: 31.08 KG/M2 | DIASTOLIC BLOOD PRESSURE: 78 MMHG

## 2023-10-13 DIAGNOSIS — M54.2 NECK PAIN: Primary | ICD-10-CM

## 2023-10-13 DIAGNOSIS — M47.812 CERVICAL SPONDYLOSIS WITHOUT MYELOPATHY: ICD-10-CM

## 2023-10-13 DIAGNOSIS — M43.12 ACQUIRED SPONDYLOLISTHESIS OF CERVICAL VERTEBRA: ICD-10-CM

## 2023-10-13 DIAGNOSIS — M79.601 BILATERAL ARM PAIN: ICD-10-CM

## 2023-10-13 DIAGNOSIS — M79.602 BILATERAL ARM PAIN: ICD-10-CM

## 2023-10-13 PROCEDURE — 99204 OFFICE O/P NEW MOD 45 MIN: CPT | Performed by: NEUROLOGICAL SURGERY

## 2023-11-13 NOTE — PROGRESS NOTES
"Subjective   History of Present Illness: Elizabeth Montiel is a 62 y.o. female is here today for follow-up with a new cervical MRI and lumbar xrays.    She reports neck pain and intermittent bilateral arm pain with weakness.  She describes numbness predominantly in the thumbs when it occurs.  She denies any change in her symptoms since we saw her a few weeks ago and ordered the MRI.    She says she is also troubled by tinnitus in the right ear that I instructed her to follow-up with her primary physician for evaluation since that is outside our area of clinical expertise.    History of Present Illness    Tobacco Use: Low Risk  (11/16/2023)    Patient History     Smoking Tobacco Use: Never     Smokeless Tobacco Use: Never     Passive Exposure: Not on file        The following portions of the patient's history were reviewed and updated as appropriate: allergies, current medications, past family history, past medical history, past social history, past surgical history and problem list.    Review of Systems    Objective     Vitals:    11/16/23 1307   BP: 120/80   Weight: 89.8 kg (198 lb)   Height: 170.2 cm (67\")     Body mass index is 31.01 kg/m².      Physical Exam  Neurologic Exam    Physical Exam:    CONSTITUTIONAL:  appears well developed, well-nourished and in no acute distress.    NECK: the neck is supple and symmetric. The trachea is midline with no masses.  The motion is markedly limited in the neck with only a few degrees of extension and about 20 degrees of flexion.  She does have some discomfort with extremes of range of motion of the neck.    PULMONARY: Respiratory effort is normal with no increased work of breathing or signs of respiratory distress.    CARDIOVASCULAR: Pedal pulses are +2/4 bilaterally. Examination of the extremities shows no edema or varicosities.    MUSCULOSKELETAL: Gait normal    SKIN: The skin is warm, dry and intact.      NEUROLOGIC:   Cranial Nerves 2-12 intact  Normal motor strength " noted although she does guard the right upper extremity to confrontational testing. Muscle bulk and tone are normal.  Sensory exam is normal to fine touch to confrontational testing bilaterally  Reflexes on the right side demonstrates 2/4 Triceps Reflex, 2/4 Biceps Reflex, 2/4 Brachioradialis Reflex, 2/4 Knee Jerk Reflex, 1/4 Ankle Jerk Reflex and no ankle clonus on the right.   Reflexes on the left side demonstrates 2/4 Triceps Reflex, 2/4 Biceps Reflex, 2/4 Brachioradialis Reflex, 2/4 Knee Jerk Reflex, 1/4 Ankle Jerk Reflex and no ankle clonus on the left.  Superficial/Primitive Reflexes: primitive reflexes were absent.  Chawla's, Babinski, and Clonus signs all negative.  No coordination deficit observed.  Radicular testing showed a negative Spurling's maneuver  Cortical function is intact and without deficits. Speech is normal.    PSYCHIATRIC: oriented to person, place and time. Patient's mood and affect are normal.    Assessment & Plan   Independent Review of Radiographic Studies:      I personally reviewed the images from the following studies.    Lumbar radiographs done at [facility] on October 15th 2023 reveal advanced degenerative changes in cervical spine with anterior osseous ankylosing across C4-C7.    MRI of the cervical spine done on November 16, 2023 at [facility] reveals bony fusion across the facet joints bilaterally from C2-C5.  Moderate canal narrowing is noted due to degenerative changes C5-C6 and C6-C7 with moderate neuroforaminal narrowing bilaterally at both levels.        Medical Decision Making:      We discussed the options of her chronic neck and bilateral arm symptoms that seem to be relative at the C5-C6 and C6-C7 areas of neuroforaminal narrowing in the neck.  She would like to try conservative options in the form of physical therapy and epidural steroid injections.  I will see her back in a month after she completes those treatments to see if she gain satisfaction with that.   If she does not gain satisfaction a protection of the neurologic elements with those events then she will be a candidate for an anterior cervical discectomy and fusion at C5-C6 and C6-C7.    Return in about 4 weeks (around 12/14/2023) for discussion of Physical Therapy results, discussion of results of pain management.    Diagnoses and all orders for this visit:    1. Cervical spondylosis without myelopathy (Primary)  -     Ambulatory Referral to Physical Therapy Evaluate and treat  -     Epidural Block    2. Acquired spondylolisthesis of cervical vertebra  -     Ambulatory Referral to Physical Therapy Evaluate and treat  -     Epidural Block    3. Neck pain  -     Ambulatory Referral to Physical Therapy Evaluate and treat  -     Epidural Block    4. Bilateral arm pain  -     Ambulatory Referral to Physical Therapy Evaluate and treat  -     Epidural Block             Quentin Faria MD FACS FAANS  Neurological Surgery

## 2023-11-15 ENCOUNTER — HOSPITAL ENCOUNTER (OUTPATIENT)
Dept: GENERAL RADIOLOGY | Facility: HOSPITAL | Age: 63
Discharge: HOME | End: 2023-11-15
Payer: COMMERCIAL

## 2023-11-15 ENCOUNTER — HOSPITAL ENCOUNTER (OUTPATIENT)
Dept: MRI IMAGING | Facility: HOSPITAL | Age: 63
Discharge: HOME | End: 2023-11-15
Admitting: NEUROLOGICAL SURGERY
Payer: COMMERCIAL

## 2023-11-15 DIAGNOSIS — M47.812 CERVICAL SPONDYLOSIS WITHOUT MYELOPATHY: ICD-10-CM

## 2023-11-15 DIAGNOSIS — M79.602 BILATERAL ARM PAIN: ICD-10-CM

## 2023-11-15 DIAGNOSIS — M43.12 ACQUIRED SPONDYLOLISTHESIS OF CERVICAL VERTEBRA: ICD-10-CM

## 2023-11-15 DIAGNOSIS — M54.2 NECK PAIN: ICD-10-CM

## 2023-11-15 DIAGNOSIS — M79.601 BILATERAL ARM PAIN: ICD-10-CM

## 2023-11-15 PROCEDURE — 72052 X-RAY EXAM NECK SPINE 6/>VWS: CPT

## 2023-11-15 PROCEDURE — 72141 MRI NECK SPINE W/O DYE: CPT

## 2023-11-16 ENCOUNTER — OFFICE VISIT (OUTPATIENT)
Dept: NEUROSURGERY | Facility: CLINIC | Age: 63
End: 2023-11-16
Payer: COMMERCIAL

## 2023-11-16 VITALS
SYSTOLIC BLOOD PRESSURE: 120 MMHG | HEIGHT: 67 IN | BODY MASS INDEX: 31.08 KG/M2 | WEIGHT: 198 LBS | DIASTOLIC BLOOD PRESSURE: 80 MMHG

## 2023-11-16 DIAGNOSIS — M79.602 BILATERAL ARM PAIN: ICD-10-CM

## 2023-11-16 DIAGNOSIS — M79.601 BILATERAL ARM PAIN: ICD-10-CM

## 2023-11-16 DIAGNOSIS — M54.2 NECK PAIN: ICD-10-CM

## 2023-11-16 DIAGNOSIS — M47.812 CERVICAL SPONDYLOSIS WITHOUT MYELOPATHY: Primary | ICD-10-CM

## 2023-11-16 DIAGNOSIS — M43.12 ACQUIRED SPONDYLOLISTHESIS OF CERVICAL VERTEBRA: ICD-10-CM

## 2023-11-16 PROCEDURE — 99213 OFFICE O/P EST LOW 20 MIN: CPT | Performed by: NEUROLOGICAL SURGERY

## 2023-11-29 ENCOUNTER — ANESTHESIA (OUTPATIENT)
Dept: PAIN MEDICINE | Facility: HOSPITAL | Age: 63
End: 2023-11-29
Payer: COMMERCIAL

## 2023-11-29 ENCOUNTER — HOSPITAL ENCOUNTER (OUTPATIENT)
Dept: GENERAL RADIOLOGY | Facility: HOSPITAL | Age: 63
Discharge: HOME | End: 2023-11-29
Payer: COMMERCIAL

## 2023-11-29 ENCOUNTER — ANESTHESIA EVENT (OUTPATIENT)
Dept: PAIN MEDICINE | Facility: HOSPITAL | Age: 63
End: 2023-11-29
Payer: COMMERCIAL

## 2023-11-29 ENCOUNTER — HOSPITAL ENCOUNTER (OUTPATIENT)
Dept: PAIN MEDICINE | Facility: HOSPITAL | Age: 63
Discharge: HOME | End: 2023-11-29
Payer: COMMERCIAL

## 2023-11-29 VITALS
HEART RATE: 73 BPM | DIASTOLIC BLOOD PRESSURE: 83 MMHG | RESPIRATION RATE: 16 BRPM | BODY MASS INDEX: 33.75 KG/M2 | SYSTOLIC BLOOD PRESSURE: 138 MMHG | WEIGHT: 210 LBS | HEIGHT: 66 IN | TEMPERATURE: 99.4 F | OXYGEN SATURATION: 99 %

## 2023-11-29 DIAGNOSIS — M47.812 CERVICAL SPONDYLOSIS WITHOUT MYELOPATHY: Primary | ICD-10-CM

## 2023-11-29 DIAGNOSIS — R52 PAIN: ICD-10-CM

## 2023-11-29 PROCEDURE — 25010000002 DEXAMETHASONE PER 1 MG: Performed by: ANESTHESIOLOGY

## 2023-11-29 PROCEDURE — 77003 FLUOROGUIDE FOR SPINE INJECT: CPT

## 2023-11-29 PROCEDURE — 25010000002 DEXAMETHASONE SODIUM PHOSPHATE 10 MG/ML SOLUTION: Performed by: ANESTHESIOLOGY

## 2023-11-29 PROCEDURE — 25510000001 IOPAMIDOL 41 % SOLUTION: Performed by: ANESTHESIOLOGY

## 2023-11-29 RX ORDER — SODIUM CHLORIDE 0.9 % (FLUSH) 0.9 %
1-10 SYRINGE (ML) INJECTION AS NEEDED
Status: DISCONTINUED | OUTPATIENT
Start: 2023-11-29 | End: 2023-11-30 | Stop reason: HOSPADM

## 2023-11-29 RX ORDER — DEXAMETHASONE SODIUM PHOSPHATE 4 MG/ML
INJECTION, SOLUTION INTRA-ARTICULAR; INTRALESIONAL; INTRAMUSCULAR; INTRAVENOUS; SOFT TISSUE
Status: COMPLETED | OUTPATIENT
Start: 2023-11-29 | End: 2023-11-29

## 2023-11-29 RX ORDER — IOPAMIDOL 408 MG/ML
12 INJECTION, SOLUTION INTRATHECAL
Status: COMPLETED | OUTPATIENT
Start: 2023-11-29 | End: 2023-11-29

## 2023-11-29 RX ORDER — FENTANYL CITRATE 50 UG/ML
50 INJECTION, SOLUTION INTRAMUSCULAR; INTRAVENOUS AS NEEDED
Status: DISCONTINUED | OUTPATIENT
Start: 2023-11-29 | End: 2023-11-30 | Stop reason: HOSPADM

## 2023-11-29 RX ORDER — DEXAMETHASONE SODIUM PHOSPHATE 10 MG/ML
10 INJECTION, SOLUTION INTRAMUSCULAR; INTRAVENOUS ONCE
Status: COMPLETED | OUTPATIENT
Start: 2023-11-29 | End: 2023-11-29

## 2023-11-29 RX ORDER — MIDAZOLAM HYDROCHLORIDE 1 MG/ML
1 INJECTION INTRAMUSCULAR; INTRAVENOUS AS NEEDED
Status: DISCONTINUED | OUTPATIENT
Start: 2023-11-29 | End: 2023-11-30 | Stop reason: HOSPADM

## 2023-11-29 RX ORDER — LIDOCAINE HYDROCHLORIDE 10 MG/ML
1 INJECTION, SOLUTION INFILTRATION; PERINEURAL ONCE AS NEEDED
Status: DISCONTINUED | OUTPATIENT
Start: 2023-11-29 | End: 2023-11-30 | Stop reason: HOSPADM

## 2023-11-29 RX ADMIN — DEXAMETHASONE SODIUM PHOSPHATE 10 MG: 4 INJECTION, SOLUTION INTRA-ARTICULAR; INTRALESIONAL; INTRAMUSCULAR; INTRAVENOUS; SOFT TISSUE at 14:19

## 2023-11-29 RX ADMIN — DEXAMETHASONE SODIUM PHOSPHATE 10 MG: 10 INJECTION, SOLUTION INTRAMUSCULAR; INTRAVENOUS at 14:13

## 2023-11-29 RX ADMIN — IOPAMIDOL 10 ML: 408 INJECTION, SOLUTION INTRATHECAL at 14:13

## 2023-11-29 NOTE — ANESTHESIA PROCEDURE NOTES
PAIN Epidural block    Pre-sedation assessment completed: 11/29/2023 2:11 PM    Patient reassessed immediately prior to procedure    Start Time: 11/29/2023 2:12 PM  Stop Time: 11/29/2023 2:19 PM  Indication:at surgeon's request and procedure for pain  Performed By  Anesthesiologist: Keith Dumont MD  Preanesthetic Checklist  Completed: patient identified, risks and benefits discussed, surgical consent, monitors and equipment checked, pre-op evaluation and timeout performed  Additional Notes  Post-Op Diagnosis Codes:     * Other spondylosis with radiculopathy, cervical region [M47.22]    Prep:  Pt Position:prone  Sterile Tech:sterile barrier, mask and gloves  Prep:chlorhexidine gluconate and isopropyl alcohol  Monitoring:blood pressure monitoring, continuous pulse oximetry and EKG  Procedure:Sedation: no     Approach:midline  Guidance: fluoroscopy  Location:cervical  Level:C7-T1  Needle Type:Tuohy  Needle Gauge:20 G  Aspiration:negative  Test Dose:negative  Medications:  Isovue:2mL    Post Assessment:  Pt Tolerance:patient tolerated the procedure well with no apparent complications  Complications:no

## 2023-11-29 NOTE — H&P
[facility]    History and Physical    Patient Name: Elizabeth Montiel  :  1960  MRN:  [id number]  Date of Admission: 2023    Subjective     Patient is a 62 y.o. female presents with chief complaint of chronic neck, shoulder: bilateral, and arm: bilateral pain.  Onset of symptoms was gradual starting several months ago.  Symptoms are associated/aggravated by nothing in particular. Symptoms improve with nothing    The following portions of the patients history were reviewed and updated as appropriate: current medications, allergies, past medical history, past surgical history, past family history, past social history, and problem list      She has been sent by neurosurgery for cervical epidural steroid injection.  She has cervical spondylosis with bilateral radicular symptoms.          Objective     Past Medical History:   Past Medical History:   Diagnosis Date   • Allergic    • Arthralgia of right knee 2021   • Arthritis    • Borderline hyperlipidemia 2016   • Chronic fatigue 2016   • Colon polyp    • Depression    • Diabetes mellitus 2024   • Eating disorder    • Fatigue    • Hemorrhoids    • History of fatty infiltration of liver 2016   • History of PCOS    • Hyperlipidemia    • Hypothyroidism    • Itching of ear 2021   • Liver disease    • Low back pain    • Lyme disease    • Multiple joint pain    • Neck pain    • Obesity, Class III, BMI 40-49.9 (morbid obesity) 2016   • LUANNE (obstructive sleep apnea)     compliant with Bipap machine   • Pneumonia    • Prediabetes    • RLS (restless legs syndrome)      Past Surgical History:   Past Surgical History:   Procedure Laterality Date   • ABDOMINAL SURGERY      FOR BENIGN TUMOR REMOVAL.   • APPENDECTOMY     • BARIATRIC SURGERY  2016    VGS   • COLONOSCOPY     • DIAGNOSTIC LAPAROSCOPY      benign tumor removal   • GASTRIC SLEEVE LAPAROSCOPIC N/A 2016    Procedure: GASTRIC SLEEVE LAPAROSCOPIC WITH HIATAL  HERNIA;  Surgeon: Enrique Hannon Jr., MD;  [place] [facility] [place] [facility] [place]:    • HEMORRHOIDECTOMY     • HYSTERECTOMY     • OOPHORECTOMY     • TUBAL ABDOMINAL LIGATION       Family History:   Family History   Adopted: Yes   Problem Relation Age of Onset   • Cancer Maternal Grandmother    • Birth defects Son         T 13   • Cancer Paternal Grandmother         Breast   • Cancer Father         Lung     Social History:   Social History     Socioeconomic History   • Marital status:    • Number of children: 3   Tobacco Use   • Smoking status: Never   • Smokeless tobacco: Never   Vaping Use   • Vaping Use: Never used   Substance and Sexual Activity   • Alcohol use: Yes     Alcohol/week: 1.0 standard drink of alcohol     Types: 1 Drinks containing 0.5 oz of alcohol per week     Comment: 1-2 WEEK   • Drug use: No   • Sexual activity: Yes     Partners: Male     Birth control/protection: Post-menopausal       Vital Signs Range for the last 24 hours  Temperature:     Temp Source:     BP:     Pulse:     Respirations:     SPO2:     O2 Amount (l/min):     O2 Devices     Weight:           --------------------------------------------------------------------------------    Current Outpatient Medications   Medication Sig Dispense Refill   • Apremilast (OTEZLA PO) Take 10 mg by mouth 2 (Two) Times a Day.     • clobetasol (TEMOVATE) 0.05 % ointment      • famotidine (PEPCID) 10 MG tablet Take 1 tablet by mouth 2 (Two) Times a Day.     • fexofenadine (ALLEGRA) 180 MG tablet Take 1 tablet by mouth Daily.     • levothyroxine (SYNTHROID, LEVOTHROID) 75 MCG tablet Take 1 tablet by mouth Every Morning.     • methocarbamol (ROBAXIN) 750 MG tablet Take 1 tablet by mouth 3 (Three) Times a Day As Needed for Muscle Spasms. 30 tablet 0   • rosuvastatin (CRESTOR) 10 MG tablet Take 1 tablet by mouth Every Night for 270 days. 90 tablet 2   • venlafaxine XR (EFFEXOR-XR) 150 MG 24 hr capsule Take 1 capsule by mouth Every Morning for 270  days. 90 capsule 2     Current Facility-Administered Medications   Medication Dose Route Frequency Provider Last Rate Last Admin   • dexAMETHasone sodium phosphate injection 10 mg  10 mg Epidural Once RenderKeith MD       • fentaNYL citrate (PF) (SUBLIMAZE) injection 50 mcg  50 mcg Intravenous PRN Keith Dumont MD       • iopamidol (ISOVUE-M 200) injection 41%  12 mL Epidural Once in imaging Keith Dumont MD       • lidocaine (XYLOCAINE) 1 % injection 1 mL  1 mL Intradermal Once PRN Keith Dumont MD       • midazolam (VERSED) injection 1 mg  1 mg Intravenous PRN Keith Dumont MD       • sodium chloride 0.9 % flush 1-10 mL  1-10 mL Intravenous PRN Keith Dumont MD           --------------------------------------------------------------------------------  Assessment & Plan      Anesthesia Evaluation           Pain impairs ability to perform ADLs: Ambulation and Exercise/Activity       Airway   Mallampati: IV  Neck ROM: limited  Difficult intubation highly probable  Comment: Difficulty decreased range of motion due to pain  Dental      Pulmonary    (+) pneumonia ,  (-) wheezes  Cardiovascular     Rhythm: regular    (+) hyperlipidemia  (-) murmur    ROS comment: Echo shows good EF, left ventricular hypertrophy and trace MR and trace TR    Neuro/Psych  (+) Sensory Deficit    PE Comment: Numbness and tingling into her upper extremities  GI/Hepatic/Renal/Endo    (+) liver disease fatty liver disease, diabetes mellitus type 2, thyroid problem     Musculoskeletal         PE comment: Slightly decreased  strength in the left hand compared to the right  Abdominal    Substance History      OB/GYN          Other                 Diagnosis and Plan    Treatment Plan  ASA 3      Procedures: Cervical Epidural Steroid Injection(ASHTYN), With fluoroscopy,      Anesthetic plan and risks discussed with patient.      Discussed with patient risk and benefits of FREDO including but not limited to : Bleeding,  "infection, PDPH, inadvertant spinal anesthetic, worsening pain, hyperglycemia, hypertension, CHF, nerve damage, steroid \"toxicity\" and AVN of hips.  Pt agrees to proceed.  Diagnosis     * Other spondylosis with radiculopathy, cervical region [M47.22]                      "

## 2023-11-29 NOTE — DISCHARGE INSTRUCTIONS

## 2023-12-05 ENCOUNTER — TELEPHONE (OUTPATIENT)
Dept: FAMILY MEDICINE CLINIC | Facility: CLINIC | Age: 63
End: 2023-12-05

## 2023-12-05 NOTE — TELEPHONE ENCOUNTER
"  Caller: Elizabeth Montiel \"Bee\"    Relationship to patient: Self    Best call back number: [phone]     Date of exposure: 12/04/2023    Personal information or concerns: PATIENT IS CALLING TO STATE SHE WAS EXPOSED TO COVID YESTERDAY.  SHE IS WANTING TO KNOW IF THERE IS A MEDICATION THAT SHE CAN TAKE TO KEEP HER FROM GETTING COVID OR MAKE IT LESS HARSH.    What is the patients preferred pharmacy:        [facility]/pharmacy #[id number] - [place] - [address] AT [facility] [phone] [facility] [phone]  [phone]     PLEASE ADVISE.        "

## 2023-12-18 NOTE — PROGRESS NOTES
"Subjective   History of Present Illness: Elizabeth Montiel is a 62 y.o. female is here today for follow-up after having a C-FREDO on 11-29-23 that was ordered for neck pain and intermittent bilateral arm pain with weakness.    Today, Ms. Montiel reports she is still having neck pain but reports her arm pain has improved.  She is very happy with the therapy results as she no longer has any radiating arm symptoms but does have some persistent suboccipital neck pain that is fairly constant but nowhere near as severe as it was initially following the therapy and injection.  She has been able to resume her everyday lifestyle without limitations and states that she can tolerate any residual neck pain that exists at this time.    History of Present Illness    Tobacco Use: Low Risk  (12/19/2023)    Patient History     Smoking Tobacco Use: Never     Smokeless Tobacco Use: Never     Passive Exposure: Not on file        The following portions of the patient's history were reviewed and updated as appropriate: allergies, current medications, past family history, past medical history, past social history, past surgical history and problem list.    Review of Systems    Objective     Vitals:    12/19/23 0952   BP: 118/76   Weight: 95.3 kg (210 lb)   Height: 167.6 cm (66\")     Body mass index is 33.89 kg/m².      Physical Exam  Neurologic Exam    Physical Exam:    CONSTITUTIONAL:  appears well developed, well-nourished and in no acute distress.    NECK: the neck is supple and symmetric. The trachea is midline with no masses.  The motion of the neck is limited in lateral bending significantly to only about 20 degrees extension to about 20 degrees but her flexion and rotation are nearly normal    PULMONARY: Respiratory effort is normal with no increased work of breathing or signs of respiratory distress.    CARDIOVASCULAR: Pedal pulses are +2/4 bilaterally. Examination of the extremities shows no edema or varicosities.    MUSCULOSKELETAL: " Gait normal    SKIN: The skin is warm, dry and intact.      NEUROLOGIC:   Cranial Nerves 2-12 intact  Normal motor strength noted although she does guard the right upper extremity to confrontational testing. Muscle bulk and tone are normal.  Sensory exam is normal to fine touch to confrontational testing bilaterally  Reflexes on the right side demonstrates 2/4 Triceps Reflex, 2/4 Biceps Reflex, 2/4 Brachioradialis Reflex, 2/4 Knee Jerk Reflex, 1/4 Ankle Jerk Reflex and no ankle clonus on the right.   Reflexes on the left side demonstrates 2/4 Triceps Reflex, 2/4 Biceps Reflex, 2/4 Brachioradialis Reflex, 2/4 Knee Jerk Reflex, 1/4 Ankle Jerk Reflex and no ankle clonus on the left.  Superficial/Primitive Reflexes: primitive reflexes were absent.  Chawla's, Babinski, and Clonus signs all negative.  No coordination deficit observed.  Radicular testing showed a negative Spurling's maneuver  Cortical function is intact and without deficits. Speech is normal.    PSYCHIATRIC: oriented to person, place and time. Patient's mood and affect are normal.    Assessment & Plan   Independent Review of Radiographic Studies:      I personally reviewed the images from the following studies.    Cervical radiographs done at [facility] on October 15th 2023 reveal advanced degenerative changes in cervical spine with anterior osseous ankylosing across C4-C7.     MRI of the cervical spine done on November 16, 2023 at [facility] reveals bony fusion across the facet joints bilaterally from C2-C5.  Moderate canal narrowing is noted due to degenerative changes C5-C6 and C6-C7 with moderate neuroforaminal narrowing bilaterally at both levels.            Medical Decision Making:      For the residual neck pain given the resolution of radicular symptoms and no evidence of radiculopathy or myelopathy on clinical exam she would like to to establish a relationship with a pain management specialist to be able to offer additional epidural  steroid injections or possibly even other interventional treatments for neck pain.  If she develops recurrent radicular symptoms in the upper extremities she knows to contact us for follow-up.    Return if symptoms worsen or fail to improve in the upper extremities.    Diagnoses and all orders for this visit:    1. Neck pain (Primary)  -     Ambulatory Referral to Pain Management Clinic    2. Acquired spondylolisthesis of cervical vertebra    3. Cervical spondylosis without myelopathy  -     Ambulatory Referral to Pain Management Clinic             Quentin Faria MD FACS FAANS  Neurological Surgery

## 2023-12-19 ENCOUNTER — OFFICE VISIT (OUTPATIENT)
Dept: NEUROSURGERY | Facility: CLINIC | Age: 63
End: 2023-12-19
Payer: COMMERCIAL

## 2023-12-19 VITALS
HEIGHT: 66 IN | BODY MASS INDEX: 33.75 KG/M2 | WEIGHT: 210 LBS | DIASTOLIC BLOOD PRESSURE: 76 MMHG | SYSTOLIC BLOOD PRESSURE: 118 MMHG

## 2023-12-19 DIAGNOSIS — M43.12 ACQUIRED SPONDYLOLISTHESIS OF CERVICAL VERTEBRA: ICD-10-CM

## 2023-12-19 DIAGNOSIS — M47.812 CERVICAL SPONDYLOSIS WITHOUT MYELOPATHY: ICD-10-CM

## 2023-12-19 DIAGNOSIS — M54.2 NECK PAIN: Primary | ICD-10-CM

## 2023-12-19 PROBLEM — M79.602 BILATERAL ARM PAIN: Status: RESOLVED | Noted: 2023-10-13 | Resolved: 2023-12-19

## 2023-12-19 PROBLEM — M79.601 BILATERAL ARM PAIN: Status: RESOLVED | Noted: 2023-10-13 | Resolved: 2023-12-19

## 2023-12-19 PROCEDURE — 99213 OFFICE O/P EST LOW 20 MIN: CPT | Performed by: NEUROLOGICAL SURGERY

## 2024-03-03 DIAGNOSIS — F32.5 MAJOR DEPRESSIVE DISORDER WITH SINGLE EPISODE, IN FULL REMISSION: ICD-10-CM

## 2024-03-03 DIAGNOSIS — E78.00 PURE HYPERCHOLESTEROLEMIA: ICD-10-CM

## 2024-03-04 RX ORDER — ROSUVASTATIN CALCIUM 10 MG/1
10 TABLET, COATED ORAL NIGHTLY
Qty: 30 TABLET | Refills: 0 | Status: SHIPPED | OUTPATIENT
Start: 2024-03-04

## 2024-03-04 RX ORDER — VENLAFAXINE HYDROCHLORIDE 150 MG/1
150 CAPSULE, EXTENDED RELEASE ORAL EVERY MORNING
Qty: 30 CAPSULE | Refills: 0 | Status: SHIPPED | OUTPATIENT
Start: 2024-03-04

## 2024-03-13 RX ORDER — PROCHLORPERAZINE 25 MG/1
SUPPOSITORY RECTAL
COMMUNITY
Start: 2024-01-20

## 2024-03-13 RX ORDER — PROCHLORPERAZINE 25 MG/1
SUPPOSITORY RECTAL
COMMUNITY
Start: 2024-01-02

## 2024-03-13 NOTE — PROGRESS NOTES
Chief Complaint:   Chief Complaint   Patient presents with    Hyperlipidemia     MED REFILL   DR GEORGE PT     Depression     [facility]         Elizabeth Montiel 63 y.o. female who presents today for Medical Management of the below listed issues. She  has a problem list of   Patient Active Problem List   Diagnosis    Acquired hypothyroidism    Major depressive disorder with single episode, in full remission    LUANNE (obstructive sleep apnea)    Alopecia    Pure hypercholesterolemia    History of sleeve gastrectomy    Class 1 obesity due to excess calories with serious comorbidity and body mass index (BMI) of 32.0 to 32.9 in adult    History of 2019 novel coronavirus disease (COVID-19)    Controlled type 2 diabetes mellitus with hyperglycemia    Psoriasis    Vitamin D deficiency    Neck pain    Acquired spondylolisthesis of cervical vertebra    Cervical spondylosis without myelopathy   .  Since the last visit, She has overall felt well.  she has been compliant with   Current Outpatient Medications:     Apremilast (OTEZLA PO), Take 10 mg by mouth 2 (Two) Times a Day., Disp: , Rfl:     clobetasol (TEMOVATE) 0.05 % ointment, , Disp: , Rfl:     Continuous Blood Gluc Sensor (Dexcom G6 Sensor), APPLY 1 EACH TOPICALLY EVERY 10 (TEN) DAYS, Disp: , Rfl:     Continuous Blood Gluc Transmit (Dexcom G6 Transmitter) misc, USE AS DIRECTED DXE11.65., Disp: , Rfl:     famotidine (PEPCID) 10 MG tablet, Take 1 tablet by mouth 2 (Two) Times a Day., Disp: , Rfl:     fexofenadine (ALLEGRA) 180 MG tablet, Take 1 tablet by mouth Daily., Disp: , Rfl:     levothyroxine (SYNTHROID, LEVOTHROID) 75 MCG tablet, Take 1 tablet by mouth Every Morning., Disp: , Rfl:     methocarbamol (ROBAXIN) 750 MG tablet, Take 1 tablet by mouth 3 (Three) Times a Day As Needed for Muscle Spasms., Disp: 30 tablet, Rfl: 0    rosuvastatin (CRESTOR) 10 MG tablet, Take 1 tablet by mouth Every Night., Disp: 90 tablet, Rfl: 3    venlafaxine XR (EFFEXOR-XR) 150 MG 24 hr  "capsule, Take 1 capsule by mouth Every Morning., Disp: 90 capsule, Rfl: 3.  She denies medication side effects.    All of the other chronic condition(s) listed above are stable w/o issues.    /77   Pulse 75   Temp 98.5 °F (36.9 °C) (Oral)   Resp 16   Ht 167.6 cm (66\")   Wt 107 kg (235 lb)   SpO2 97%   BMI 37.93 kg/m²     Results for orders placed or performed in visit on 09/25/21   Comprehensive Metabolic Panel    Specimen: Blood   Result Value Ref Range    Glucose 98 65 - 99 mg/dL    BUN 14 6 - 24 mg/dL    Creatinine 0.92 0.57 - 1.00 mg/dL    eGFR Non African Am 69 >59 mL/min/1.73    eGFR African Am 79 >59 mL/min/1.73    BUN/Creatinine Ratio 15 9 - 23    Sodium 142 134 - 144 mmol/L    Potassium 4.8 3.5 - 5.2 mmol/L    Chloride 104 96 - 106 mmol/L    Total CO2 23 20 - 29 mmol/L    Calcium 9.7 8.7 - 10.2 mg/dL    Total Protein 7.6 6.0 - 8.5 g/dL    Albumin 4.3 3.8 - 4.9 g/dL    Globulin 3.3 1.5 - 4.5 g/dL    A/G Ratio 1.3 1.2 - 2.2    Total Bilirubin 0.4 0.0 - 1.2 mg/dL    Alkaline Phosphatase 97 44 - 121 IU/L    AST (SGOT) 20 0 - 40 IU/L    ALT (SGPT) 17 0 - 32 IU/L   Lipid Panel With / Chol / HDL Ratio    Specimen: Blood   Result Value Ref Range    Total Cholesterol 154 100 - 199 mg/dL    Triglycerides 155 (H) 0 - 149 mg/dL    HDL Cholesterol 37 (L) >39 mg/dL    VLDL Cholesterol Shaun 27 5 - 40 mg/dL    LDL Chol Calc (NIH) 90 0 - 99 mg/dL    Chol/HDL Ratio 4.2 0.0 - 4.4 ratio   CK    Specimen: Blood   Result Value Ref Range    Creatine Kinase 61 32 - 182 U/L   TSH    Specimen: Blood   Result Value Ref Range    TSH 4.870 (H) 0.450 - 4.500 uIU/mL   CBC & Differential    Specimen: Blood   Result Value Ref Range    WBC 7.0 3.4 - 10.8 x10E3/uL    RBC 4.90 3.77 - 5.28 x10E6/uL    Hemoglobin 13.5 11.1 - 15.9 g/dL    Hematocrit 40.3 34.0 - 46.6 %    MCV 82 79 - 97 fL    MCH 27.6 26.6 - 33.0 pg    MCHC 33.5 31.5 - 35.7 g/dL    RDW 14.4 11.7 - 15.4 %    Platelets 267 150 - 450 x10E3/uL    Neutrophil Rel % 48 Not " Estab. %    Lymphocyte Rel % 39 Not Estab. %    Monocyte Rel % 8 Not Estab. %    Eosinophil Rel % 4 Not Estab. %    Basophil Rel % 1 Not Estab. %    Neutrophils Absolute 3.3 1.4 - 7.0 x10E3/uL    Lymphocytes Absolute 2.7 0.7 - 3.1 x10E3/uL    Monocytes Absolute 0.6 0.1 - 0.9 x10E3/uL    Eosinophils Absolute 0.3 0.0 - 0.4 x10E3/uL    Basophils Absolute 0.1 0.0 - 0.2 x10E3/uL    Immature Granulocyte Rel % 0 Not Estab. %    Immature Grans Absolute 0.0 0.0 - 0.1 x10E3/uL             The following portions of the patient's history were reviewed and updated as appropriate: allergies, current medications, past family history, past medical history, past social history, past surgical history, and problem list.    Review of Systems   Constitutional:  Negative for activity change, chills and fever.   Respiratory:  Negative for cough.    Cardiovascular:  Negative for chest pain.   Psychiatric/Behavioral:  Negative for dysphoric mood.        Objective             Physical Exam  Constitutional:       General: She is not in acute distress.     Appearance: She is well-developed.   Cardiovascular:      Rate and Rhythm: Normal rate and regular rhythm.   Pulmonary:      Effort: Pulmonary effort is normal.      Breath sounds: Normal breath sounds.   Neurological:      Mental Status: She is alert and oriented to person, place, and time.   Psychiatric:         Behavior: Behavior normal.         Thought Content: Thought content normal.     Labs from her specialist independently reviewed by me at today's visit.        Diagnoses and all orders for this visit:    1. Pure hypercholesterolemia (Primary)  -     rosuvastatin (CRESTOR) 10 MG tablet; Take 1 tablet by mouth Every Night.  Dispense: 90 tablet; Refill: 3    2. Major depressive disorder with single episode, in full remission  -     venlafaxine XR (EFFEXOR-XR) 150 MG 24 hr capsule; Take 1 capsule by mouth Every Morning.  Dispense: 90 capsule; Refill: 3

## 2024-03-14 ENCOUNTER — OFFICE VISIT (OUTPATIENT)
Dept: FAMILY MEDICINE CLINIC | Facility: CLINIC | Age: 64
End: 2024-03-14
Payer: COMMERCIAL

## 2024-03-14 VITALS
RESPIRATION RATE: 16 BRPM | BODY MASS INDEX: 37.77 KG/M2 | HEART RATE: 75 BPM | WEIGHT: 235 LBS | OXYGEN SATURATION: 97 % | TEMPERATURE: 98.5 F | DIASTOLIC BLOOD PRESSURE: 77 MMHG | SYSTOLIC BLOOD PRESSURE: 124 MMHG | HEIGHT: 66 IN

## 2024-03-14 DIAGNOSIS — F32.5 MAJOR DEPRESSIVE DISORDER WITH SINGLE EPISODE, IN FULL REMISSION: Chronic | ICD-10-CM

## 2024-03-14 DIAGNOSIS — E78.00 PURE HYPERCHOLESTEROLEMIA: Primary | Chronic | ICD-10-CM

## 2024-03-14 PROCEDURE — 99214 OFFICE O/P EST MOD 30 MIN: CPT | Performed by: FAMILY MEDICINE

## 2024-03-14 RX ORDER — ROSUVASTATIN CALCIUM 10 MG/1
10 TABLET, COATED ORAL NIGHTLY
Qty: 90 TABLET | Refills: 3 | Status: SHIPPED | OUTPATIENT
Start: 2024-03-14

## 2024-03-14 RX ORDER — VENLAFAXINE HYDROCHLORIDE 150 MG/1
150 CAPSULE, EXTENDED RELEASE ORAL EVERY MORNING
Qty: 90 CAPSULE | Refills: 3 | Status: SHIPPED | OUTPATIENT
Start: 2024-03-14

## 2025-03-06 ENCOUNTER — APPOINTMENT (OUTPATIENT)
Dept: CT IMAGING | Facility: HOSPITAL | Age: 65
End: 2025-03-06
Payer: OTHER MISCELLANEOUS

## 2025-03-06 ENCOUNTER — HOSPITAL ENCOUNTER (EMERGENCY)
Facility: HOSPITAL | Age: 65
Discharge: HOME | End: 2025-03-06
Attending: EMERGENCY MEDICINE
Payer: OTHER MISCELLANEOUS

## 2025-03-06 VITALS
SYSTOLIC BLOOD PRESSURE: 158 MMHG | WEIGHT: 240 LBS | DIASTOLIC BLOOD PRESSURE: 86 MMHG | OXYGEN SATURATION: 98 % | BODY MASS INDEX: 38.57 KG/M2 | HEART RATE: 89 BPM | TEMPERATURE: 97.7 F | RESPIRATION RATE: 17 BRPM | HEIGHT: 66 IN

## 2025-03-06 DIAGNOSIS — S01.01XA LACERATION OF SCALP WITHOUT FOREIGN BODY, INITIAL ENCOUNTER: Primary | ICD-10-CM

## 2025-03-06 PROCEDURE — 70450 CT HEAD/BRAIN W/O DYE: CPT

## 2025-03-06 PROCEDURE — 99283 EMERGENCY DEPT VISIT LOW MDM: CPT | Performed by: EMERGENCY MEDICINE

## 2025-03-06 PROCEDURE — 99284 EMERGENCY DEPT VISIT MOD MDM: CPT

## 2025-03-06 PROCEDURE — 12001 RPR S/N/AX/GEN/TRNK 2.5CM/<: CPT | Performed by: EMERGENCY MEDICINE

## 2025-03-06 RX ORDER — ACETAMINOPHEN 325 MG/1
650 TABLET ORAL ONCE
Status: COMPLETED | OUTPATIENT
Start: 2025-03-06 | End: 2025-03-06

## 2025-03-06 RX ADMIN — ACETAMINOPHEN 650 MG: 325 TABLET ORAL at 14:14

## 2025-03-06 NOTE — FSED PROVIDER NOTE
Subjective   History of Present Illness  Patient is an otherwise healthy 64-year-old who comes to ED with complaint of a head laceration.  She states she was at work and she was going to put something up in a cabinet and hit her head on a piece of the cabinet sustaining a laceration.  She states she saw stars but did not pass out.  She is complaining currently of mild nausea and dizziness and pain at the site of the laceration.  She is not on blood thinners.  She denies any focal weakness or numbness, confusion. Last tet shot 3 years ago as per pt.        Review of Systems   Constitutional: Negative.    HENT:          See HPI   Respiratory: Negative.     Cardiovascular: Negative.    Musculoskeletal: Negative.    Skin:  Positive for wound.   Neurological:  Positive for dizziness and headaches. Negative for speech difficulty, weakness and numbness.       Past Medical History:   Diagnosis Date    Allergic     Arthralgia of right knee 11/11/2021    Arthritis     Borderline hyperlipidemia 05/03/2016    Chronic fatigue 05/03/2016    Colon polyp     Depression     Diabetes mellitus 06/01/2024    Eating disorder     Fatigue     Hemorrhoids     History of fatty infiltration of liver 05/03/2016    History of PCOS     Hyperlipidemia     Hypothyroidism     Itching of ear 11/11/2021    Liver disease     Low back pain     Lyme disease     Multiple joint pain     Neck pain     Obesity, Class III, BMI 40-49.9 (morbid obesity) 05/03/2016    LUANNE (obstructive sleep apnea)     compliant with Bipap machine    Pneumonia     Prediabetes     RLS (restless legs syndrome)        Allergies   Allergen Reactions    Penicillins Anaphylaxis    Doxycycline GI Intolerance    Codeine Rash    Tricor [Fenofibrate] Rash       Past Surgical History:   Procedure Laterality Date    ABDOMINAL SURGERY      FOR BENIGN TUMOR REMOVAL.    APPENDECTOMY      BARIATRIC SURGERY  2016    VGS    COLONOSCOPY      DIAGNOSTIC LAPAROSCOPY      benign tumor removal     "GASTRIC SLEEVE LAPAROSCOPIC N/A 7/11/2016    Procedure: GASTRIC SLEEVE LAPAROSCOPIC WITH HIATAL HERNIA;  Surgeon: Enrique Hannon Jr., MD;  Location: [facility];  Service:     HEMORRHOIDECTOMY      HYSTERECTOMY      OOPHORECTOMY      TUBAL ABDOMINAL LIGATION         Family History   Adopted: Yes   Problem Relation Age of Onset    Cancer Maternal Grandmother     Birth defects Son         T 13    Cancer Paternal Grandmother         Breast    Cancer Father         Lung       Social History     Socioeconomic History    Marital status:     Number of children: 3   Tobacco Use    Smoking status: Never    Smokeless tobacco: Never   Vaping Use    Vaping status: Never Used   Substance and Sexual Activity    Alcohol use: Yes     Alcohol/week: 1.0 standard drink of alcohol     Types: 1 Drinks containing 0.5 oz of alcohol per week     Comment: 1-2 WEEK    Drug use: No    Sexual activity: Yes     Partners: Male     Birth control/protection: Post-menopausal           Objective   Physical Exam  Vitals and nursing note reviewed.   Constitutional:       Appearance: Normal appearance.      Comments: Patient is a well-appearing, ambulatory 64-year-old in no acute distress.   HENT:      Head: Normocephalic.        Comments: Small, \" V shaped \" <1 cm laceration to the left parietal area as shown.  No active bleeding.  No foreign body.  No deep structures involved.  No active bleeding.     Right Ear: Tympanic membrane, ear canal and external ear normal.      Left Ear: Tympanic membrane, ear canal and external ear normal.      Nose: Nose normal.      Mouth/Throat:      Mouth: Mucous membranes are moist.   Eyes:      Extraocular Movements: Extraocular movements intact.      Pupils: Pupils are equal, round, and reactive to light.   Cardiovascular:      Rate and Rhythm: Normal rate.   Musculoskeletal:      Cervical back: No tenderness.   Skin:     General: Skin is warm and dry.   Neurological:      General: No focal deficit " present.      Mental Status: She is alert and oriented to person, place, and time.      GCS: GCS eye subscore is 4. GCS verbal subscore is 5. GCS motor subscore is 6.      Cranial Nerves: Cranial nerves 2-12 are intact.      Sensory: Sensation is intact.      Motor: Motor function is intact.      Coordination: Coordination is intact.      Gait: Gait is intact.         Laceration Repair    Date/Time: 3/6/2025 4:24 PM    Performed by: Karla Arshad MD  Authorized by: Karla Arshad MD    Consent:     Consent obtained:  Verbal    Consent given by:  Patient    Risks, benefits, and alternatives were discussed: yes      Risks discussed:  Pain    Alternatives discussed:  No treatment  Universal protocol:     Procedure explained and questions answered to patient or proxy's satisfaction: yes      Test results available: yes      Imaging studies available: yes      Required blood products, implants, devices, and special equipment available: no    Anesthesia:     Anesthesia method:  None  Laceration details:     Location:  Scalp    Scalp location:  L parietal    Length (cm):  0.8    Depth (mm):  5  Pre-procedure details:     Preparation:  Patient was prepped and draped in usual sterile fashion and imaging obtained to evaluate for foreign bodies  Exploration:     Hemostasis achieved with:  Direct pressure    Imaging outcome: foreign body not noted      Wound exploration: wound explored through full range of motion      Wound extent: no fascia violation noted, no foreign bodies/material noted, no muscle damage noted and no underlying fracture noted      Contaminated: no    Treatment:     Area cleansed with:  Terrance    Amount of cleaning:  Standard    Irrigation solution:  Sterile saline    Irrigation method:  Syringe    Debridement:  None    Undermining:  None  Skin repair:     Repair method:  Staples    Number of staples:  3  Approximation:     Approximation:  Close  Repair type:     Repair type:   Simple  Post-procedure details:     Dressing:  Open (no dressing)    Procedure completion:  Tolerated well, no immediate complications             ED Course          CT Head Without Contrast (Preliminary result)  Result time 03/06/25 14:54:07  Preliminary result                Impression:    1. No acute intracranial abnormality is identified. There is moderate  right maxillary sinus mucosal thickening. There is a tiny scalp hematoma  over the superior left parietal bone from today's head trauma. The  remainder of the head CT is normal. Specifically no acute skull fracture  or intracranial hemorrhage is identified.     Radiation dose reduction techniques were utilized, including automated  exposure control and exposure modulation based on body size.                                                    Medical Decision Making  Patient is a 64-year-old female who appears older than stated age and comes now complaining of headache and nausea with pain at the site after sustaining a laceration at work when she hit her head on a cabinet.  No loss of consciousness.  Will do head CT given persistent symptoms.  Tylenol for headache.  Will clean and stable laceration after CT.  15:11 EST  Patient resting comfortably no distress she is reading sitting in the chair next to her stretcher.  She is feeling better compared with arrival.  She was advised CT head is negative.  Laceration was repaired with staples.  Patient we discharged with head instructions and laceration instructions.  She is comfortable with plan of care and reasons for return were clearly discussed.    Problems Addressed:  Laceration of scalp without foreign body, initial encounter: complicated acute illness or injury    Amount and/or Complexity of Data Reviewed  Radiology: ordered.    Risk  OTC drugs.        Final diagnoses:   Laceration of scalp without foreign body, initial encounter       ED Disposition  ED Disposition       ED Disposition   Discharge     Condition   Stable    Comment   --               David Giordano MD  [address]  [address]  [place] [address]  [phone]    In 1 week      [facility] [address]    As needed         Medication List      No changes were made to your prescriptions during this visit.

## 2025-03-06 NOTE — Clinical Note
[facility] FSED EM  [address]  [place] [address]    Elizabeth Montiel was seen and treated in our emergency department on 3/6/2025.  She may return to work on 03/08/2025.         Thank you for choosing [facility].    Karla Arshad MD

## 2025-03-06 NOTE — DISCHARGE INSTRUCTIONS
Tylenol as directed for any headache or pain at incision site.  Follow-up with PMD in 7 to 10 days for removal of staples.  Return to ED with any headache not relieved by Tylenol, vomiting inability to tolerate p.o., focal weakness or numbness, new complaint or issue.

## 2025-03-20 ENCOUNTER — OFFICE VISIT (OUTPATIENT)
Dept: FAMILY MEDICINE CLINIC | Facility: CLINIC | Age: 65
End: 2025-03-20
Payer: COMMERCIAL

## 2025-03-20 VITALS
HEIGHT: 66 IN | HEART RATE: 84 BPM | BODY MASS INDEX: 38.41 KG/M2 | OXYGEN SATURATION: 98 % | WEIGHT: 239 LBS | DIASTOLIC BLOOD PRESSURE: 64 MMHG | SYSTOLIC BLOOD PRESSURE: 126 MMHG

## 2025-03-20 DIAGNOSIS — E78.00 PURE HYPERCHOLESTEROLEMIA: Primary | Chronic | ICD-10-CM

## 2025-03-20 DIAGNOSIS — E03.9 ACQUIRED HYPOTHYROIDISM: ICD-10-CM

## 2025-03-20 DIAGNOSIS — Z86.0100 HISTORY OF COLON POLYPS: ICD-10-CM

## 2025-03-20 DIAGNOSIS — Z12.31 ENCOUNTER FOR SCREENING MAMMOGRAM FOR MALIGNANT NEOPLASM OF BREAST: ICD-10-CM

## 2025-03-20 DIAGNOSIS — Z78.0 POSTMENOPAUSE: ICD-10-CM

## 2025-03-20 DIAGNOSIS — F32.5 MAJOR DEPRESSIVE DISORDER WITH SINGLE EPISODE, IN FULL REMISSION: Chronic | ICD-10-CM

## 2025-03-20 RX ORDER — SEMAGLUTIDE 1.34 MG/ML
1 INJECTION, SOLUTION SUBCUTANEOUS
COMMUNITY
Start: 2025-01-16

## 2025-03-20 RX ORDER — VENLAFAXINE HYDROCHLORIDE 150 MG/1
150 CAPSULE, EXTENDED RELEASE ORAL EVERY MORNING
Qty: 90 CAPSULE | Refills: 3 | Status: SHIPPED | OUTPATIENT
Start: 2025-03-20

## 2025-03-20 RX ORDER — ROSUVASTATIN CALCIUM 10 MG/1
10 TABLET, COATED ORAL NIGHTLY
Qty: 90 TABLET | Refills: 3 | Status: SHIPPED | OUTPATIENT
Start: 2025-03-20

## 2025-03-20 RX ORDER — ALBUTEROL SULFATE 90 UG/1
INHALANT RESPIRATORY (INHALATION)
COMMUNITY
Start: 2024-11-12

## 2025-03-20 NOTE — ASSESSMENT & PLAN NOTE
Lipids are well-controlled.  We will continue with rosuvastatin 10 mg bedtime dosing  Orders:    rosuvastatin (CRESTOR) 10 MG tablet; Take 1 tablet by mouth Every Night.    CK; Future    Lipid Panel With / Chol / HDL Ratio; Future    CBC & Differential; Future    Comprehensive Metabolic Panel; Future

## 2025-03-20 NOTE — PROGRESS NOTES
Chief Complaint  Follow-up, Hyperlipidemia, and Depression    Subjective        Hyperlipidemia  Pertinent negatives include no chest pain or myalgias.   DepressionPatient is not experiencing: chest pain and nausea.  Her past medical history is significant for depression.     Pertinent negative symptoms include no abdominal pain, no anorexia, no joint pain, no change in stool, no chest pain, no chills, no congestion, no cough, no diaphoresis, no fatigue, no fever, no headaches, no joint swelling, no myalgias, no nausea, no neck pain, no numbness, no rash, no sore throat, no swollen glands, no dysuria, no vertigo, no visual change, no vomiting and no weakness.   Other symptom:  Med check        The patient presents for a routine medication check.    She has scheduled routine laboratory tests in the coming weeks to monitor her diabetes. She reports no adverse effects from her current medications, rosuvastatin and venlafaxine. She continues to use Ozempic as part of her treatment regimen.    Her last ophthalmological examination was conducted a year ago [facility]. She has a history of colonoscopy with polypectomy on 06/11/2014, but the details are not readily available. She has also undergone a mammogram and bone density test in the past, both of which were performed by her gynecologist.    She has experienced several health issues from November to now, including pneumonia, a broken foot, influenza, and a head laceration. She received her pneumonia vaccine in September but contracted pneumonia in November. Despite receiving the influenza vaccine, she developed the flu a few weeks ago.    MEDICATIONS  - Rosuvastatin  - Venlafaxine  - Ozempic    IMMUNIZATIONS  - Pneumonia shot: Received in September  - Influenza shot: Received     Review of Systems   Constitutional:  Negative for chills, diaphoresis, fatigue and fever.   HENT:  Negative for congestion, sore throat and swollen glands.    Respiratory:  Negative for  "cough.    Cardiovascular:  Negative for chest pain.   Gastrointestinal:  Negative for abdominal pain, anorexia, nausea and vomiting.   Genitourinary:  Negative for dysuria.   Musculoskeletal:  Negative for joint pain, myalgias and neck pain.   Skin:  Negative for rash.   Neurological:  Negative for vertigo, weakness and numbness.        Vital Signs:   Vitals:    03/20/25 0846   BP: 126/64   Pulse: 84   SpO2: 98%   Weight: 108 kg (239 lb)   Height: 167.6 cm (66\")            3/20/2025     8:58 AM   PHQ-2/PHQ-9 Depression Screening   Little interest or pleasure in doing things Not at all   Feeling down, depressed, or hopeless Not at all                 Physical Exam     General Appearance: No Acute Distress, normal appearance, well developed, well nourished.  Vital signs: Vital Signs reviewed.  Respiratory: lungs clear to auscultation, no wheezes, normal respiratory effort.  Cardiovascular: Regular rate and ryhthym with no murmurs, rubs, or gallop. No carotid bruits auscultated bilaterally.  Extremities: No pretibial edema, bilaterally.  Skin: Warm and dry, no rash.  Psychiatric: patient cooperative, normal affect.       The following data was reviewed by: David Giordano MD on 03/20/2025:      Results  - Laboratory Studies:    - Total cholesterol: 137    - LDL cholesterol: 62    - HDL cholesterol: 56    - Triglycerides: 98    - Sodium: 142    - Potassium: 4.6    - Chloride: 106    - Calcium: 9.9    - BUN: 14    - Creatinine: 0.92    - Vitamin D level: 33.2 (normal)    - TSH: 1.81    - Free T4: 1.45    - Albumin creatinine ratio: 4    - Hemoglobin A1c: Done                Assessment and Plan      1. Health maintenance.  - Order DEXA scan to screen for osteoporosis.  - Order mammogram to screen for breast cancer.  - Referral to gastroenterology for colonoscopy due to history of colon polyps on 06/11/2014.  - Schedule eye exam at [facility].    2. Depression: Stable.  - Continue venlafaxine.    3. Hyperlipidemia: " Stable.  - Continue rosuvastatin 10 mg at bedtime.    4. Diabetes Mellitus.  - Continue current diabetes management using the pump.  Sees as per endocrinology)   [facility]    5. Recent pneumonia.  - Documented pneumonia in November despite receiving the pneumonia shot in September.    6. Recent influenza.  - Documented flu a few weeks ago despite receiving the flu shot.    7. Recent injuries.  - Documented broken foot and head laceration requiring staples.    Follow-up  - Follow up in 1 year.    PROCEDURE  The patient has a history of colonoscopy with polypectomy on 06/11/2014.         Pure hypercholesterolemia     Lipids are well-controlled.  We will continue with rosuvastatin 10 mg bedtime dosing  Orders:    rosuvastatin (CRESTOR) 10 MG tablet; Take 1 tablet by mouth Every Night.    CK; Future    Lipid Panel With / Chol / HDL Ratio; Future    CBC & Differential; Future    Comprehensive Metabolic Panel; Future    Major depressive disorder with single episode, in full remission    Depression stable continue with venlafaxine  Orders:    venlafaxine XR (EFFEXOR-XR) 150 MG 24 hr capsule; Take 1 capsule by mouth Every Morning.    Acquired hypothyroidism  Condition is stable. The current medical regimen is effective;  continue present plan and medications.  Orders:    TSH; Future    History of colon polyps  Referral to gastroenterology for colonoscopy due to history of colon polyps  Orders:    Ambulatory Referral to Gastroenterology    Encounter for screening mammogram for malignant neoplasm of breast  Mammography ordered  Orders:    Mammo Screening Digital Tomosynthesis Bilateral With CAD; Future    Postmenopause  DEXA scan ordered  Orders:    DEXA Bone Density Axial; Future       Return in about 1 year (around 3/20/2026) for Adult wellness & medication appt, schedule 30 min.     Patient was given instructions and counseling regarding her condition or for health maintenance advice.  Please see specific information pulled into the AVS if appropriate.     Patient or patient representative verbalized consent for the use of Ambient Listening during the visit with  David Giordano MD for chart documentation. 3/20/2025  09:13 EDT

## 2025-03-20 NOTE — ASSESSMENT & PLAN NOTE
Depression stable continue with venlafaxine  Orders:    venlafaxine XR (EFFEXOR-XR) 150 MG 24 hr capsule; Take 1 capsule by mouth Every Morning.

## 2025-03-20 NOTE — ASSESSMENT & PLAN NOTE
Condition is stable. The current medical regimen is effective;  continue present plan and medications.  Orders:    TSH; Future

## 2025-04-15 ENCOUNTER — TELEPHONE (OUTPATIENT)
Dept: GASTROENTEROLOGY | Facility: CLINIC | Age: 65
End: 2025-04-15
Payer: COMMERCIAL

## 2025-04-15 NOTE — TELEPHONE ENCOUNTER
Last colonoscopy 6/11/14 - OP visit    Personal hx polyps  Unknown family hx polyps or cx    Asa or blood thinners:  None    List medications:  Otezla  Rosuvastatin  Venlafaxine  Ozempic  Levothyroxine  Allegra  Pepcid    OA form scanned in media

## 2025-04-16 ENCOUNTER — PREP FOR SURGERY (OUTPATIENT)
Dept: OTHER | Facility: HOSPITAL | Age: 65
End: 2025-04-16
Payer: COMMERCIAL

## 2025-04-16 ENCOUNTER — TELEPHONE (OUTPATIENT)
Dept: GASTROENTEROLOGY | Facility: CLINIC | Age: 65
End: 2025-04-16
Payer: COMMERCIAL

## 2025-04-16 DIAGNOSIS — Z12.11 ENCOUNTER FOR SCREENING FOR MALIGNANT NEOPLASM OF COLON: Primary | ICD-10-CM

## 2025-04-16 NOTE — TELEPHONE ENCOUNTER
PT SCHEDULED AT [facility] 06/20/2025 VERBALIZES UNDERSTANDING [facility] WILL CALL A DAY PRIOR TO PROCEDURE WITH LUIS M  PT ON OZEMPIC VERBALIZES UNDERSTANDING TO HOLD A WEEK PRIOR TO PROCEDURE ALSO HIGHLIGHTED ON CLS PREP INFORMATION SHEET AND [facility]AM[facility]LET MAILED TO ADDRESS ON FILE VERIFIED BY PT OK FOR HUB TO RELAY

## 2025-04-24 ENCOUNTER — HOSPITAL ENCOUNTER (OUTPATIENT)
Dept: MAMMOGRAPHY | Facility: HOSPITAL | Age: 65
Discharge: HOME | End: 2025-04-24
Payer: COMMERCIAL

## 2025-04-24 ENCOUNTER — HOSPITAL ENCOUNTER (OUTPATIENT)
Dept: BONE DENSITY | Facility: HOSPITAL | Age: 65
Discharge: HOME | End: 2025-04-24
Payer: COMMERCIAL

## 2025-04-24 DIAGNOSIS — Z12.31 ENCOUNTER FOR SCREENING MAMMOGRAM FOR MALIGNANT NEOPLASM OF BREAST: ICD-10-CM

## 2025-04-24 DIAGNOSIS — Z78.0 POSTMENOPAUSE: ICD-10-CM

## 2025-04-24 PROCEDURE — 77080 DXA BONE DENSITY AXIAL: CPT

## 2025-04-24 PROCEDURE — 77067 SCR MAMMO BI INCL CAD: CPT

## 2025-04-24 PROCEDURE — 77063 BREAST TOMOSYNTHESIS BI: CPT

## 2025-04-28 ENCOUNTER — RESULTS FOLLOW-UP (OUTPATIENT)
Dept: FAMILY MEDICINE CLINIC | Facility: CLINIC | Age: 65
End: 2025-04-28
Payer: COMMERCIAL

## 2025-04-28 NOTE — LETTER
Elizabeth Montiel  [address]   [place] [address]    April 29, 2025     Dear Ms. Montiel:    Below are the results from your recent visit:Osteopenia on bone density note FRAX score is in acceptable range. Risk of fracture is not increased in the next 10 years. Still need to do weightbearing exercise and take calcium and vitamin D rescreen 2 year     Resulted Orders   DEXA Bone Density Axial    Narrative    DXA BONE MINERAL DENSITY MEASUREMENT, 4/24/2025 7:33 AM     INDICATION:  Postmenopausal female referred for DXA bone mineral density assessment.     TECHNIQUE:  The bone mineral density (BMD) was determined using a dual energy  [employer] x-ray densitometer. DXA bone mineral density  measurements were obtained at the lumbar spine and left hip.     Note that the lumbar bone density may be artificially elevated due to  vertebral compression fracture, degenerative disc disease/osteophyte  formation and sclerosis, aortic calcification. Femoral BMD could be  falsely elevated in the setting of degenerative change/arthritis.     COMPARISON: None.     HIP:  At the left femoral neck, lowest T score measures -1.5.  BMD: 0.681 g/cm2.  Z score: -0.1  WHO category: Osteopenia.        LUMBAR SPINE:  At the lumbar spine, L1-2 T score measures 0.3.  BMD: 1.012 g/cm2.  Z score: 1.8  WHO category: Normal.  L3-4 were excluded due to artifactual elevation in BMD.       Impression    1.  Osteopenia/low bone density.        FRAX:  *  10 year fracture risk: Major osteoporotic fracture, 7.7%; hip  fracture, 0.7%.  *  Fracture probability calculated for an untreated patient.      Current recommendations are that a follow-up bone density be obtained  at an interval of not less than 2 years except in specific  circumstances, such as after initiation or change of therapy.     The Bone Health and Osteoporosis Foundation (BHOF) recommends  consideration of FDA-approved pharmacologic therapy in postmenopausal  women and men > 50  years old with :  * Fracture of vertebrae (clinical or subclinical), hip, wrist, pelvis,  or humerus.  * DXA T-score -2.5 or lower in the lumbar spine, femoral neck, or total  hip. Predictive value of isolated measurement of 1/3 radius is currently  being investigated (use clinical judgment).  * Low bone mass (osteopenia) and a US-adapted WHO 10-year probability of  a hip fracture > 3% or 10-year probability of any major  osteoporosis-related fracture > 20%.  * Patient preferences may indicate treatment for people with 10-year  fracture probabilities above or below these levels.  *Michael MS, Susana SL, Jameel KL, et al. The clinician`s guide to  prevention and treatment of osteoporosis [published correction appears  in Osteoporos Int. 2022 Jul 28]. Osteoporos Int. 2022;33(10):5907-5212.        This report was finalized on 4/28/2025 9:59 AM by Gopi Pearce M.D  on Workstation: [id number]          .    If you have any questions or concerns, please don't hesitate to call.         Sincerely,        Debbie Valdovinos PA-C

## 2025-04-29 NOTE — TELEPHONE ENCOUNTER
Results mailed to pt   Osteopenia on bone density note FRAX score is in acceptable range. Risk of fracture is not increased in the next 10 years. Still need to do weightbearing exercise and take calcium and vitamin D rescreen 2 year

## 2025-06-20 ENCOUNTER — OUTSIDE FACILITY SERVICE (OUTPATIENT)
Dept: GASTROENTEROLOGY | Facility: CLINIC | Age: 65
End: 2025-06-20
Payer: COMMERCIAL